# Patient Record
Sex: MALE | Race: WHITE | Employment: FULL TIME | URBAN - METROPOLITAN AREA
[De-identification: names, ages, dates, MRNs, and addresses within clinical notes are randomized per-mention and may not be internally consistent; named-entity substitution may affect disease eponyms.]

---

## 2017-01-02 LAB — INR: 2.7 (ref 2–3)

## 2017-01-03 ENCOUNTER — ANTI-COAG VISIT (OUTPATIENT)
Dept: INTERNAL MEDICINE CLINIC | Facility: CLINIC | Age: 62
End: 2017-01-03

## 2017-01-03 DIAGNOSIS — D68.9 CLOTTING DISORDER (HCC): Primary | ICD-10-CM

## 2017-02-02 ENCOUNTER — ANTI-COAG VISIT (OUTPATIENT)
Dept: INTERNAL MEDICINE CLINIC | Facility: CLINIC | Age: 62
End: 2017-02-02

## 2017-02-02 DIAGNOSIS — D68.9 CLOTTING DISORDER (HCC): Primary | ICD-10-CM

## 2017-02-02 LAB — INR: 2.5 (ref 2–3)

## 2017-03-01 LAB — INR: 2.2 (ref 2–3)

## 2017-03-03 ENCOUNTER — ANTI-COAG VISIT (OUTPATIENT)
Dept: INTERNAL MEDICINE CLINIC | Facility: CLINIC | Age: 62
End: 2017-03-03

## 2017-03-03 ENCOUNTER — TELEPHONE (OUTPATIENT)
Dept: INTERNAL MEDICINE CLINIC | Facility: CLINIC | Age: 62
End: 2017-03-03

## 2017-03-03 DIAGNOSIS — D68.9 BLOOD CLOTTING DISORDER (HCC): Primary | ICD-10-CM

## 2017-03-03 DIAGNOSIS — D68.9 CLOTTING DISORDER (HCC): Primary | ICD-10-CM

## 2017-03-04 PROBLEM — D68.9 BLOOD CLOTTING DISORDER (HCC): Status: ACTIVE | Noted: 2017-03-04

## 2017-03-26 LAB — INR: 2.5 (ref 2–3)

## 2017-03-28 ENCOUNTER — TELEPHONE (OUTPATIENT)
Dept: INTERNAL MEDICINE CLINIC | Facility: CLINIC | Age: 62
End: 2017-03-28

## 2017-03-28 DIAGNOSIS — D68.9 CLOTTING DISORDER (HCC): Primary | ICD-10-CM

## 2017-03-31 ENCOUNTER — ANTI-COAG VISIT (OUTPATIENT)
Dept: INTERNAL MEDICINE CLINIC | Facility: CLINIC | Age: 62
End: 2017-03-31

## 2017-03-31 DIAGNOSIS — D68.9 CLOTTING DISORDER (HCC): ICD-10-CM

## 2017-03-31 DIAGNOSIS — D68.9 BLOOD CLOTTING DISORDER (HCC): Primary | ICD-10-CM

## 2017-04-18 NOTE — TELEPHONE ENCOUNTER
· PLEASE ADVISE ON REFILL. THANKS.    Recent Visits       Provider Department Primary Dx    4 months ago Crystal Baker MD [facility], [facility], [address] Physical exam, annual    1 year ago Crystal Baker MD [facility], [facility], [address] [facility]

## 2017-04-20 RX ORDER — WARFARIN SODIUM 5 MG/1
TABLET ORAL
Qty: 135 TABLET | Refills: 0 | Status: SHIPPED | OUTPATIENT
Start: 2017-04-20 | End: 2017-07-16

## 2017-04-27 ENCOUNTER — ANTI-COAG VISIT (OUTPATIENT)
Dept: INTERNAL MEDICINE CLINIC | Facility: CLINIC | Age: 62
End: 2017-04-27

## 2017-04-27 DIAGNOSIS — D68.9 BLOOD CLOTTING DISORDER (HCC): Primary | ICD-10-CM

## 2017-04-27 DIAGNOSIS — D68.9 CLOTTING DISORDER (HCC): ICD-10-CM

## 2017-05-01 ENCOUNTER — TELEPHONE (OUTPATIENT)
Dept: INTERNAL MEDICINE CLINIC | Facility: CLINIC | Age: 62
End: 2017-05-01

## 2017-05-01 NOTE — TELEPHONE ENCOUNTER
[facility]  Correction code is - please see referral on 3/30/17-epic

## 2017-05-23 ENCOUNTER — ANTI-COAG VISIT (OUTPATIENT)
Dept: INTERNAL MEDICINE CLINIC | Facility: CLINIC | Age: 62
End: 2017-05-23

## 2017-05-23 ENCOUNTER — TELEPHONE (OUTPATIENT)
Dept: INTERNAL MEDICINE CLINIC | Facility: CLINIC | Age: 62
End: 2017-05-23

## 2017-05-23 DIAGNOSIS — D68.9 BLOOD CLOTTING DISORDER (HCC): Primary | ICD-10-CM

## 2017-05-23 DIAGNOSIS — D68.9 CLOTTING DISORDER (HCC): ICD-10-CM

## 2017-05-23 NOTE — TELEPHONE ENCOUNTER
Pt states that he had colonoscopy @ [facility] 2013 with a 5 year recheck.    Msg left on Med Record voicemail @ [facility].

## 2017-06-12 ENCOUNTER — TELEPHONE (OUTPATIENT)
Dept: INTERNAL MEDICINE CLINIC | Facility: CLINIC | Age: 62
End: 2017-06-12

## 2017-06-12 DIAGNOSIS — D68.69 SECONDARY HYPERCOAGULABLE STATE (HCC): Primary | ICD-10-CM

## 2017-06-12 NOTE — TELEPHONE ENCOUNTER
Shani Davenport from [facility] that the pt needs a referral for an INR. Kvng Fearing the code is . Call with any questions.

## 2017-06-15 NOTE — TELEPHONE ENCOUNTER
Dr. Sabino العراقي you need to start the process for this pt to get his supplies for his coagucheck [facility]. .  Please read the 1 st message.   Thanks nikhil

## 2017-06-15 NOTE — TELEPHONE ENCOUNTER
I,m not making any head way with Dr. Aureliano Mondragon. Is there something you folks can send to the  To have him fill out? We have not done any paper work for  This in the past and don't know where to start. Thank you.  nikhil

## 2017-06-15 NOTE — TELEPHONE ENCOUNTER
This information needs to be given to the PCP's ofc so they can enter the referral and have the dr sign off on it. Once it is entered we will automatically get the information .     Thank You

## 2017-06-15 NOTE — TELEPHONE ENCOUNTER
No, there is nothing for us to send over . We only process what the Dr's send to us .  We dont initiate anything,     Thank You

## 2017-06-16 NOTE — TELEPHONE ENCOUNTER
I have no idea how to do a REFERRAL for an INR. I ordered yearly INR order yesterday. I'm not sure how to enter that diagnosis  code on order. 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  Have someone send me a form or forward referral request like they

## 2017-06-16 NOTE — TELEPHONE ENCOUNTER
Referral signed. Please send to appropriate medical supplier listed in encounters so patient can continue to monitor PT/INR at home.

## 2017-06-26 ENCOUNTER — ANTI-COAG VISIT (OUTPATIENT)
Dept: INTERNAL MEDICINE CLINIC | Facility: CLINIC | Age: 62
End: 2017-06-26

## 2017-06-26 DIAGNOSIS — D68.9 BLOOD CLOTTING DISORDER (HCC): ICD-10-CM

## 2017-06-26 DIAGNOSIS — D68.9 CLOTTING DISORDER (HCC): ICD-10-CM

## 2017-07-18 RX ORDER — WARFARIN SODIUM 5 MG/1
TABLET ORAL
Qty: 135 TABLET | Refills: 0 | Status: SHIPPED | OUTPATIENT
Start: 2017-07-18 | End: 2017-10-21

## 2017-07-26 ENCOUNTER — TELEPHONE (OUTPATIENT)
Dept: INTERNAL MEDICINE CLINIC | Facility: CLINIC | Age: 62
End: 2017-07-26

## 2017-07-26 ENCOUNTER — ANTI-COAG VISIT (OUTPATIENT)
Dept: INTERNAL MEDICINE CLINIC | Facility: CLINIC | Age: 62
End: 2017-07-26

## 2017-07-26 DIAGNOSIS — D68.9 CLOTTING DISORDER (HCC): ICD-10-CM

## 2017-07-26 DIAGNOSIS — D68.9 BLOOD CLOTTING DISORDER (HCC): ICD-10-CM

## 2017-07-26 LAB — INR: 2.5 (ref 2–3)

## 2017-07-26 NOTE — TELEPHONE ENCOUNTER
[facility] medical record department states that they have no record of pt having colonoscopy.    Does pt remember name of gastroenterologist?   Left message to call back [phone]

## 2017-08-30 LAB — INR: 2.5 (ref 2–3)

## 2017-09-01 ENCOUNTER — ANTI-COAG VISIT (OUTPATIENT)
Dept: INTERNAL MEDICINE CLINIC | Facility: CLINIC | Age: 62
End: 2017-09-01

## 2017-09-01 DIAGNOSIS — D68.9 BLOOD CLOTTING DISORDER (HCC): ICD-10-CM

## 2017-09-01 DIAGNOSIS — D68.9 CLOTTING DISORDER (HCC): ICD-10-CM

## 2017-09-20 ENCOUNTER — TELEPHONE (OUTPATIENT)
Dept: INTERNAL MEDICINE CLINIC | Facility: CLINIC | Age: 62
End: 2017-09-20

## 2017-09-23 LAB — INR: 3 (ref 2–3)

## 2017-09-29 ENCOUNTER — ANTI-COAG VISIT (OUTPATIENT)
Dept: INTERNAL MEDICINE CLINIC | Facility: CLINIC | Age: 62
End: 2017-09-29

## 2017-09-29 DIAGNOSIS — D68.9 BLOOD CLOTTING DISORDER (HCC): ICD-10-CM

## 2017-09-29 DIAGNOSIS — D68.9 CLOTTING DISORDER (HCC): ICD-10-CM

## 2017-10-23 RX ORDER — WARFARIN SODIUM 5 MG/1
TABLET ORAL
Qty: 135 TABLET | Refills: 0 | Status: SHIPPED | OUTPATIENT
Start: 2017-10-23 | End: 2018-01-20

## 2017-10-27 ENCOUNTER — ANTI-COAG VISIT (OUTPATIENT)
Dept: INTERNAL MEDICINE CLINIC | Facility: CLINIC | Age: 62
End: 2017-10-27

## 2017-10-27 DIAGNOSIS — D68.9 BLOOD CLOTTING DISORDER (HCC): ICD-10-CM

## 2017-10-27 DIAGNOSIS — D68.9 CLOTTING DISORDER (HCC): ICD-10-CM

## 2017-11-07 ENCOUNTER — TELEPHONE (OUTPATIENT)
Dept: INTERNAL MEDICINE CLINIC | Facility: CLINIC | Age: 62
End: 2017-11-07

## 2017-11-07 NOTE — TELEPHONE ENCOUNTER
Patient is due for colorectal screening. Unable to locate previous report. Wife notified and states that she will track down the report and call back with the information.

## 2017-11-24 ENCOUNTER — ANTI-COAG VISIT (OUTPATIENT)
Dept: INTERNAL MEDICINE CLINIC | Facility: CLINIC | Age: 62
End: 2017-11-24

## 2017-11-24 DIAGNOSIS — D68.9 BLOOD CLOTTING DISORDER (HCC): ICD-10-CM

## 2017-11-24 DIAGNOSIS — D68.9 CLOTTING DISORDER (HCC): ICD-10-CM

## 2017-12-29 ENCOUNTER — ANTI-COAG VISIT (OUTPATIENT)
Dept: INTERNAL MEDICINE CLINIC | Facility: CLINIC | Age: 62
End: 2017-12-29

## 2017-12-29 DIAGNOSIS — D68.9 CLOTTING DISORDER (HCC): ICD-10-CM

## 2017-12-29 DIAGNOSIS — D68.9 BLOOD CLOTTING DISORDER (HCC): ICD-10-CM

## 2018-01-11 NOTE — TELEPHONE ENCOUNTER
Pending Prescriptions Disp Refills    SIMVASTATIN 20 MG Oral Tab [Pharmacy Med Name: SIMVASTATIN 20MG TABLETS] 90 tablet 0     Sig: TAKE 1 TABLET BY MOUTH EVERY NIGHT AT BEDTIME           Last Office Visit with PCP: Visit date not found  Last Blood Press

## 2018-01-12 RX ORDER — SIMVASTATIN 20 MG
TABLET ORAL
Qty: 90 TABLET | Refills: 0 | Status: SHIPPED | OUTPATIENT
Start: 2018-01-12 | End: 2018-04-13

## 2018-01-12 NOTE — TELEPHONE ENCOUNTER
Order Information     Ordered Status Priority Ordering User Department   01/12/18 Sent (none) Dre Person MD [facility]   Order History   Outpatient   Date/Time Action Taken User Additional Information   01/12/18 1216 Sign Yuniel Corral [place]

## 2018-01-15 ENCOUNTER — TELEPHONE (OUTPATIENT)
Dept: INTERNAL MEDICINE CLINIC | Facility: CLINIC | Age: 63
End: 2018-01-15

## 2018-01-15 NOTE — TELEPHONE ENCOUNTER
Pt called in stating he has a consult with his dentist on 1/25 because he is going to have a tooth extracted and a new one implanted. Pt is asking how long he may need to be off coumadin prior to the extraction? Please advise.

## 2018-01-15 NOTE — TELEPHONE ENCOUNTER
Hi Dr. Yuniel Corral,     Please advise on how many days pt should hold warfarin, coumadin clinic cannot give orders on that. Thanks.

## 2018-01-17 NOTE — TELEPHONE ENCOUNTER
Spoke with patient and relayed PVR message below--patient verbalizes understanding and agreement. Patient states 1/25/18 is only the consultation, not the actual surgery.  Patient states he may need something in writing re:Coumadin stop and start, but wi

## 2018-01-20 NOTE — TELEPHONE ENCOUNTER
LOV Cou[facility]in Clinic 12/29/17    LOV PVR 11/30/16 for Px    LR 10/23/17 fpr #135    Please advise on refill

## 2018-01-22 RX ORDER — WARFARIN SODIUM 5 MG/1
TABLET ORAL
Qty: 135 TABLET | Refills: 0 | Status: SHIPPED | OUTPATIENT
Start: 2018-01-22 | End: 2018-04-16

## 2018-02-02 ENCOUNTER — TELEPHONE (OUTPATIENT)
Dept: INTERNAL MEDICINE CLINIC | Facility: CLINIC | Age: 63
End: 2018-02-02

## 2018-02-02 NOTE — TELEPHONE ENCOUNTER
Dr Arce People called regarding pt  Pt is to stop coumadin 4 days prior to dental implant per Dr Melissa Navarro would like a note stating that   Faxed to his office  ROM#[phone]

## 2018-02-03 ENCOUNTER — TELEPHONE (OUTPATIENT)
Dept: INTERNAL MEDICINE CLINIC | Facility: CLINIC | Age: 63
End: 2018-02-03

## 2018-02-03 NOTE — TELEPHONE ENCOUNTER
I was paged unable to get in tough with [facility]    I called pt instead   INR 5.5 per his monitor  He held coumadin today and will hold tomorrow as well  Per pt off coumadin next week for procedure  Will check coumadin on Monday

## 2018-02-05 ENCOUNTER — ANTI-COAG VISIT (OUTPATIENT)
Dept: INTERNAL MEDICINE CLINIC | Facility: CLINIC | Age: 63
End: 2018-02-05

## 2018-02-05 DIAGNOSIS — D68.9 CLOTTING DISORDER (HCC): ICD-10-CM

## 2018-02-05 DIAGNOSIS — D68.9 BLOOD CLOTTING DISORDER (HCC): ICD-10-CM

## 2018-02-05 LAB — INR: 5.5 (ref 2–3)

## 2018-02-05 NOTE — TELEPHONE ENCOUNTER
Pt calling requesting note to be sent to  Dr Lake Torres office stating pt is ok to be off coumadin 4 days prior to the procedure. Pt states his appt for the procedure is 02/09. Please rush and fax to [phone].

## 2018-02-14 ENCOUNTER — TELEPHONE (OUTPATIENT)
Dept: INTERNAL MEDICINE CLINIC | Facility: CLINIC | Age: 63
End: 2018-02-14

## 2018-02-14 DIAGNOSIS — D68.69 SECONDARY HYPERCOAGULABLE STATE (HCC): Primary | ICD-10-CM

## 2018-02-14 NOTE — TELEPHONE ENCOUNTER
[facility] supplies rep called and requested supplies for patient's coumadin. Pended referral.  Please review diagnois and sign off if you approve. Thank you!  Becky Jimenez [phone] [facility]

## 2018-02-19 ENCOUNTER — OFFICE VISIT (OUTPATIENT)
Dept: INTERNAL MEDICINE CLINIC | Facility: CLINIC | Age: 63
End: 2018-02-19

## 2018-02-19 VITALS
BODY MASS INDEX: 28.63 KG/M2 | HEART RATE: 65 BPM | WEIGHT: 200 LBS | SYSTOLIC BLOOD PRESSURE: 151 MMHG | DIASTOLIC BLOOD PRESSURE: 78 MMHG | HEIGHT: 70 IN | RESPIRATION RATE: 16 BRPM

## 2018-02-19 DIAGNOSIS — D68.59 HYPERCOAGULABLE STATE (HCC): ICD-10-CM

## 2018-02-19 DIAGNOSIS — Z00.00 PHYSICAL EXAM, ANNUAL: Primary | ICD-10-CM

## 2018-02-19 DIAGNOSIS — Z12.11 SCREENING FOR COLORECTAL CANCER: ICD-10-CM

## 2018-02-19 DIAGNOSIS — Z12.12 SCREENING FOR COLORECTAL CANCER: ICD-10-CM

## 2018-02-19 PROCEDURE — 99396 PREV VISIT EST AGE 40-64: CPT | Performed by: INTERNAL MEDICINE

## 2018-02-19 NOTE — PROGRESS NOTES
Darien Garcia is a 57year old male who presents for a complete physical exam.   HPI:   Pt complains of nothing. There is no immunization history on file for this patient.   Wt Readings from Last 6 Encounters:  02/19/18 : 200 lb (90.7 kg)  11/30/16 : Disorder Father       Social History:  Smoking status: Current Some Day Smoker                                                    Packs/day: 0.00      Years: 20.00        Types: Cigars  Alcohol use: Yes           6.0 oz/week     Cans of beer: 12 per week intact    ASSESSMENT AND PLAN:   1. Physical exam, annual    Annika Mathews is a 57year old male who presents for a complete physical exam.  Pt's weight is Body mass index is 28.7 kg/m². , recommended low fat diet and aerobic exercise 30 minutes three time

## 2018-02-28 LAB — INR: 2 (ref 2–3)

## 2018-03-01 ENCOUNTER — ANTI-COAG VISIT (OUTPATIENT)
Dept: INTERNAL MEDICINE CLINIC | Facility: CLINIC | Age: 63
End: 2018-03-01

## 2018-03-01 DIAGNOSIS — D68.9 BLOOD CLOTTING DISORDER (HCC): ICD-10-CM

## 2018-03-01 DIAGNOSIS — D68.9 CLOTTING DISORDER (HCC): ICD-10-CM

## 2018-03-24 LAB — INR: 3.2 (ref 2–3)

## 2018-03-28 ENCOUNTER — TELEPHONE (OUTPATIENT)
Dept: INTERNAL MEDICINE CLINIC | Facility: CLINIC | Age: 63
End: 2018-03-28

## 2018-03-28 ENCOUNTER — ANTI-COAG VISIT (OUTPATIENT)
Dept: INTERNAL MEDICINE CLINIC | Facility: CLINIC | Age: 63
End: 2018-03-28

## 2018-03-28 DIAGNOSIS — D68.59 HYPERCOAGULOPATHY (HCC): ICD-10-CM

## 2018-03-28 DIAGNOSIS — D68.59 HYPERCOAGULOPATHY (HCC): Primary | ICD-10-CM

## 2018-03-28 DIAGNOSIS — D68.9 BLOOD CLOTTING DISORDER (HCC): ICD-10-CM

## 2018-03-28 DIAGNOSIS — D68.9 CLOTTING DISORDER (HCC): ICD-10-CM

## 2018-03-28 NOTE — TELEPHONE ENCOUNTER
Dr. Kelly Escamilla,          Patient's current anticoagulation monitoring order with Coumadin Clinic is  3/4/18. Please sign pending order for renewal.     Thank you.

## 2018-03-29 ENCOUNTER — ANTI-COAG VISIT (OUTPATIENT)
Dept: INTERNAL MEDICINE CLINIC | Facility: CLINIC | Age: 63
End: 2018-03-29

## 2018-03-29 DIAGNOSIS — D68.9 CLOTTING DISORDER (HCC): ICD-10-CM

## 2018-03-29 DIAGNOSIS — D68.59 HYPERCOAGULOPATHY (HCC): ICD-10-CM

## 2018-03-29 DIAGNOSIS — D68.9 BLOOD CLOTTING DISORDER (HCC): ICD-10-CM

## 2018-04-13 ENCOUNTER — TELEPHONE (OUTPATIENT)
Dept: INTERNAL MEDICINE CLINIC | Facility: CLINIC | Age: 63
End: 2018-04-13

## 2018-04-13 RX ORDER — SIMVASTATIN 20 MG
TABLET ORAL
Qty: 90 TABLET | Refills: 0 | Status: SHIPPED | OUTPATIENT
Start: 2018-04-13 | End: 2018-07-11

## 2018-04-13 NOTE — TELEPHONE ENCOUNTER
Pt stts he had labs done @ [email or web address] [place] location  Pt stts [facility] advised him they have no relationship with PCP  Pt was billed for labs  Pt also is still awaiting lab results   Please advise   Alt phone # [phone]

## 2018-04-15 ENCOUNTER — TELEPHONE (OUTPATIENT)
Dept: INTERNAL MEDICINE CLINIC | Facility: CLINIC | Age: 63
End: 2018-04-15

## 2018-04-15 NOTE — TELEPHONE ENCOUNTER
Labs show Your blood glucose is elevated above what is considered a normal fasting level which should be under 100 mg/dl. This suggests that you are prone to developing diabetes if this value continues to go up over time which often happens.  Regular exerci

## 2018-04-16 ENCOUNTER — TELEPHONE (OUTPATIENT)
Dept: OTHER | Age: 63
End: 2018-04-16

## 2018-04-16 RX ORDER — WARFARIN SODIUM 5 MG/1
TABLET ORAL
Qty: 135 TABLET | Refills: 0 | Status: SHIPPED | OUTPATIENT
Start: 2018-04-16 | End: 2018-07-15

## 2018-04-16 NOTE — TELEPHONE ENCOUNTER
Patient calling stating that he got charged $333 for [facility] labs ordered by Dr. Angela Abdullahi on 11/2017. Per patient, he has contacted insurance company.      Patient states, per insurance company, there is not direct relationship between [facility] and [address] [place] [address]

## 2018-05-02 ENCOUNTER — ANTI-COAG VISIT (OUTPATIENT)
Dept: INTERNAL MEDICINE CLINIC | Facility: CLINIC | Age: 63
End: 2018-05-02

## 2018-05-02 DIAGNOSIS — D68.9 BLOOD CLOTTING DISORDER (HCC): ICD-10-CM

## 2018-05-02 DIAGNOSIS — D68.59 HYPERCOAGULOPATHY (HCC): ICD-10-CM

## 2018-05-02 DIAGNOSIS — D68.9 CLOTTING DISORDER (HCC): ICD-10-CM

## 2018-05-22 ENCOUNTER — MED REC SCAN ONLY (OUTPATIENT)
Dept: CARDIOLOGY CLINIC | Facility: CLINIC | Age: 63
End: 2018-05-22

## 2018-05-31 ENCOUNTER — TELEPHONE (OUTPATIENT)
Dept: INTERNAL MEDICINE CLINIC | Facility: CLINIC | Age: 63
End: 2018-05-31

## 2018-05-31 NOTE — TELEPHONE ENCOUNTER
Taj Rawls from [facility] calling to assist pt regarding issue with 2/19/18 lab orders for [facility]       Pt advised sent to [facility]- out of network so claim denied & pt charged  $600    Asking for office to contact pt to assist in resolving- questioning  if  referral can be done to [facility]- Referral Dept advised to forward  to clinical staff    Also See 4/16/18 encounter to karis Westfall/[facility] ph# [phone]- no callback requested- can contact pt directly

## 2018-06-01 ENCOUNTER — ANTI-COAG VISIT (OUTPATIENT)
Dept: INTERNAL MEDICINE CLINIC | Facility: CLINIC | Age: 63
End: 2018-06-01

## 2018-06-01 DIAGNOSIS — D68.9 BLOOD CLOTTING DISORDER (HCC): ICD-10-CM

## 2018-06-01 DIAGNOSIS — D68.9 CLOTTING DISORDER (HCC): ICD-10-CM

## 2018-06-01 DIAGNOSIS — D68.59 HYPERCOAGULOPATHY (HCC): ICD-10-CM

## 2018-06-04 ENCOUNTER — TELEPHONE (OUTPATIENT)
Dept: INTERNAL MEDICINE CLINIC | Facility: CLINIC | Age: 63
End: 2018-06-04

## 2018-06-04 DIAGNOSIS — D68.9 CLOTTING DISORDER (HCC): Primary | ICD-10-CM

## 2018-06-14 ENCOUNTER — TELEPHONE (OUTPATIENT)
Dept: INTERNAL MEDICINE CLINIC | Facility: CLINIC | Age: 63
End: 2018-06-14

## 2018-06-14 NOTE — TELEPHONE ENCOUNTER
Harjinder Hernandez from [facility] would like status on compression stockings from an order they have requested 10days ago, please advise

## 2018-06-26 ENCOUNTER — ANTI-COAG VISIT (OUTPATIENT)
Dept: INTERNAL MEDICINE CLINIC | Facility: CLINIC | Age: 63
End: 2018-06-26

## 2018-06-26 DIAGNOSIS — D68.9 CLOTTING DISORDER (HCC): ICD-10-CM

## 2018-06-26 DIAGNOSIS — D68.59 HYPERCOAGULOPATHY (HCC): ICD-10-CM

## 2018-06-26 DIAGNOSIS — D68.9 BLOOD CLOTTING DISORDER (HCC): ICD-10-CM

## 2018-07-11 RX ORDER — SIMVASTATIN 20 MG
TABLET ORAL
Qty: 90 TABLET | Refills: 0 | Status: SHIPPED | OUTPATIENT
Start: 2018-07-11 | End: 2018-10-24

## 2018-07-11 RX ORDER — SIMVASTATIN 20 MG
TABLET ORAL
Qty: 90 TABLET | Refills: 0 | OUTPATIENT
Start: 2018-07-11

## 2018-07-16 RX ORDER — WARFARIN SODIUM 5 MG/1
TABLET ORAL
Qty: 135 TABLET | Refills: 0 | Status: SHIPPED | OUTPATIENT
Start: 2018-07-16 | End: 2018-10-17

## 2018-07-17 RX ORDER — SIMVASTATIN 20 MG
TABLET ORAL
Qty: 90 TABLET | Refills: 0 | OUTPATIENT
Start: 2018-07-17

## 2018-07-18 NOTE — TELEPHONE ENCOUNTER
[id number], [address], [phone]   Medication Detail      Disp Refills Start End    simvastatin 20 MG Oral Tab 90 tablet 0 7/11/2018     Sig: TAKE 1 TABLET BY MOUTH EVERY NIGHT AT BEDTIME    E-Prescribing Statu

## 2018-07-23 RX ORDER — SIMVASTATIN 20 MG
TABLET ORAL
Qty: 90 TABLET | Refills: 0 | Status: SHIPPED | OUTPATIENT
Start: 2018-07-23 | End: 2019-01-23

## 2018-07-26 ENCOUNTER — TELEPHONE (OUTPATIENT)
Dept: INTERNAL MEDICINE CLINIC | Facility: CLINIC | Age: 63
End: 2018-07-26

## 2018-07-26 DIAGNOSIS — I82.90 ANTEMORTEM THROMBUS: Primary | ICD-10-CM

## 2018-07-26 NOTE — TELEPHONE ENCOUNTER
Received a faxed from [employer] requesting a referral for compression hose.      Please sign off on referral request.     Thank you, Asa

## 2018-07-30 ENCOUNTER — ANTI-COAG VISIT (OUTPATIENT)
Dept: CARDIOLOGY CLINIC | Facility: CLINIC | Age: 63
End: 2018-07-30

## 2018-07-30 DIAGNOSIS — D68.9 CLOTTING DISORDER (HCC): ICD-10-CM

## 2018-07-30 DIAGNOSIS — D68.59 HYPERCOAGULOPATHY (HCC): ICD-10-CM

## 2018-07-30 DIAGNOSIS — D68.9 BLOOD CLOTTING DISORDER (HCC): ICD-10-CM

## 2018-07-30 LAB — INR: 2 (ref 2–3)

## 2018-08-29 LAB — INR: 3.1 (ref 2–3)

## 2018-08-30 ENCOUNTER — ANTI-COAG VISIT (OUTPATIENT)
Dept: INTERNAL MEDICINE CLINIC | Facility: CLINIC | Age: 63
End: 2018-08-30

## 2018-08-30 DIAGNOSIS — D68.9 CLOTTING DISORDER (HCC): ICD-10-CM

## 2018-08-30 DIAGNOSIS — D68.9 BLOOD CLOTTING DISORDER (HCC): ICD-10-CM

## 2018-08-30 DIAGNOSIS — D68.59 HYPERCOAGULOPATHY (HCC): ICD-10-CM

## 2018-09-04 ENCOUNTER — MED REC SCAN ONLY (OUTPATIENT)
Dept: CARDIOLOGY CLINIC | Facility: CLINIC | Age: 63
End: 2018-09-04

## 2018-10-03 ENCOUNTER — ANTI-COAG VISIT (OUTPATIENT)
Dept: CARDIOLOGY CLINIC | Facility: CLINIC | Age: 63
End: 2018-10-03

## 2018-10-03 ENCOUNTER — MED REC SCAN ONLY (OUTPATIENT)
Dept: CARDIOLOGY CLINIC | Facility: CLINIC | Age: 63
End: 2018-10-03

## 2018-10-03 DIAGNOSIS — D68.9 BLOOD CLOTTING DISORDER (HCC): ICD-10-CM

## 2018-10-03 DIAGNOSIS — D68.59 HYPERCOAGULOPATHY (HCC): ICD-10-CM

## 2018-10-03 DIAGNOSIS — D68.9 CLOTTING DISORDER (HCC): ICD-10-CM

## 2018-10-18 RX ORDER — WARFARIN SODIUM 5 MG/1
TABLET ORAL
Qty: 135 TABLET | Refills: 0 | Status: SHIPPED | OUTPATIENT
Start: 2018-10-18 | End: 2019-01-07

## 2018-10-18 NOTE — TELEPHONE ENCOUNTER
Requested Prescriptions     Pending Prescriptions Disp Refills   • COUMADIN 5 MG Oral Tab [Pharmacy Med Name: COUMADIN 5MG TABLETS (PEACH)] 135 tablet 0     Sig: TAKE 1 1/2 TABLETS BY MOUTH EVERY EVENING OR AS DIRECTED BY COUMADIN CLINIC       Last Office

## 2018-10-23 RX ORDER — SIMVASTATIN 20 MG
TABLET ORAL
Qty: 90 TABLET | Refills: 0 | OUTPATIENT
Start: 2018-10-23

## 2018-10-24 RX ORDER — SIMVASTATIN 20 MG
TABLET ORAL
Qty: 90 TABLET | Refills: 0 | Status: SHIPPED | OUTPATIENT
Start: 2018-10-24 | End: 2019-01-23

## 2018-10-31 ENCOUNTER — ANTI-COAG VISIT (OUTPATIENT)
Dept: INTERNAL MEDICINE CLINIC | Facility: CLINIC | Age: 63
End: 2018-10-31

## 2018-10-31 DIAGNOSIS — D68.9 CLOTTING DISORDER (HCC): ICD-10-CM

## 2018-10-31 DIAGNOSIS — D68.59 HYPERCOAGULOPATHY (HCC): ICD-10-CM

## 2018-10-31 DIAGNOSIS — D68.9 BLOOD CLOTTING DISORDER (HCC): ICD-10-CM

## 2018-10-31 PROCEDURE — 93793 ANTICOAG MGMT PT WARFARIN: CPT | Performed by: INTERNAL MEDICINE

## 2018-12-18 ENCOUNTER — ANTI-COAG VISIT (OUTPATIENT)
Dept: INTERNAL MEDICINE CLINIC | Facility: CLINIC | Age: 63
End: 2018-12-18

## 2018-12-18 DIAGNOSIS — D68.59 HYPERCOAGULOPATHY (HCC): ICD-10-CM

## 2018-12-18 DIAGNOSIS — D68.9 CLOTTING DISORDER (HCC): ICD-10-CM

## 2018-12-18 DIAGNOSIS — D68.9 BLOOD CLOTTING DISORDER (HCC): ICD-10-CM

## 2018-12-18 PROCEDURE — 93793 ANTICOAG MGMT PT WARFARIN: CPT | Performed by: INTERNAL MEDICINE

## 2018-12-26 ENCOUNTER — MED REC SCAN ONLY (OUTPATIENT)
Dept: INTERNAL MEDICINE CLINIC | Facility: CLINIC | Age: 63
End: 2018-12-26

## 2019-01-07 RX ORDER — WARFARIN SODIUM 5 MG/1
TABLET ORAL
Qty: 135 TABLET | Refills: 0 | Status: SHIPPED | OUTPATIENT
Start: 2019-01-07 | End: 2019-03-17

## 2019-01-23 ENCOUNTER — TELEPHONE (OUTPATIENT)
Dept: GASTROENTEROLOGY | Facility: CLINIC | Age: 64
End: 2019-01-23

## 2019-01-23 ENCOUNTER — OFFICE VISIT (OUTPATIENT)
Dept: GASTROENTEROLOGY | Facility: CLINIC | Age: 64
End: 2019-01-23

## 2019-01-23 VITALS — SYSTOLIC BLOOD PRESSURE: 125 MMHG | DIASTOLIC BLOOD PRESSURE: 68 MMHG | HEART RATE: 61 BPM

## 2019-01-23 DIAGNOSIS — Z86.010 HX OF COLONIC POLYPS: Primary | ICD-10-CM

## 2019-01-23 DIAGNOSIS — Z92.29 HISTORY OF COUMADIN THERAPY: ICD-10-CM

## 2019-01-23 DIAGNOSIS — Z86.010 HISTORY OF COLON POLYPS: Primary | ICD-10-CM

## 2019-01-23 PROCEDURE — 99243 OFF/OP CNSLTJ NEW/EST LOW 30: CPT | Performed by: INTERNAL MEDICINE

## 2019-01-23 PROCEDURE — 99212 OFFICE O/P EST SF 10 MIN: CPT | Performed by: INTERNAL MEDICINE

## 2019-01-23 NOTE — PROGRESS NOTES
Omar Muhammad is a 70year old male.     HPI:   Patient presents with:  Colonoscopy Screening: last colonoscopy 5 yrs ago @ [address] [facility] per pt    The patient is a 68-year-old male who has a history of Antithrombin III deficiency and prior history of DVT total) by mouth one time for 1 dose.  Take as directed Disp: 1 Bottle Rfl: 0   COUMADIN 5 MG Oral Tab TAKE 1 1/2 TABLETS BY MOUTH EVERY EVENING OR AS DIRECTED BY COUMADIN CLINIC Disp: 135 tablet Rfl: 0   SIMVASTATIN 20 MG Oral Tab TAKE 1 TABLET BY MOUTH HUMZA

## 2019-01-23 NOTE — PATIENT INSTRUCTIONS
Colonoscopy for history of colon polyps  - colyte prep   - hold coumadin 3 days before and check stat PT/INR am of procedure  - MAC sedation

## 2019-01-23 NOTE — TELEPHONE ENCOUNTER
Scheduled for:  Colonoscopy 71413  Provider Name: Dr Jw Noriega  Date:  Fri 4/19/19  Location:  [facility]  Sedation:  MAC  Time:  9:15 am  Prep: split dose colyte  Meds/Allergies Reconciled?:  NKDA  Diagnosis with codes:  HCP Z86.010  Was patient informed to call ins

## 2019-01-25 RX ORDER — SIMVASTATIN 20 MG
TABLET ORAL
Qty: 90 TABLET | Refills: 0 | Status: SHIPPED | OUTPATIENT
Start: 2019-01-25 | End: 2019-04-25

## 2019-01-30 LAB — INR: 2.2 (ref 2–3)

## 2019-01-31 ENCOUNTER — ANTI-COAG VISIT (OUTPATIENT)
Dept: INTERNAL MEDICINE CLINIC | Facility: CLINIC | Age: 64
End: 2019-01-31

## 2019-01-31 DIAGNOSIS — D68.9 CLOTTING DISORDER (HCC): ICD-10-CM

## 2019-01-31 DIAGNOSIS — D68.59 HYPERCOAGULOPATHY (HCC): ICD-10-CM

## 2019-01-31 DIAGNOSIS — D68.9 BLOOD CLOTTING DISORDER (HCC): ICD-10-CM

## 2019-01-31 PROCEDURE — 93793 ANTICOAG MGMT PT WARFARIN: CPT | Performed by: INTERNAL MEDICINE

## 2019-02-06 ENCOUNTER — MED REC SCAN ONLY (OUTPATIENT)
Dept: INTERNAL MEDICINE CLINIC | Facility: CLINIC | Age: 64
End: 2019-02-06

## 2019-02-25 LAB — INR: 2.3 (ref 2–3)

## 2019-02-26 ENCOUNTER — ANTI-COAG VISIT (OUTPATIENT)
Dept: INTERNAL MEDICINE CLINIC | Facility: CLINIC | Age: 64
End: 2019-02-26

## 2019-02-26 DIAGNOSIS — D68.59 HYPERCOAGULOPATHY (HCC): ICD-10-CM

## 2019-02-26 DIAGNOSIS — D68.9 CLOTTING DISORDER (HCC): ICD-10-CM

## 2019-02-26 DIAGNOSIS — D68.9 BLOOD CLOTTING DISORDER (HCC): ICD-10-CM

## 2019-03-19 RX ORDER — WARFARIN SODIUM 5 MG/1
TABLET ORAL
Qty: 135 TABLET | Refills: 0 | Status: SHIPPED | OUTPATIENT
Start: 2019-03-19 | End: 2019-06-09

## 2019-03-19 NOTE — TELEPHONE ENCOUNTER
Future Appointments   Date Time Provider Paloma Trang   4/19/2019  9:15 AM [facility], PROCEDURE [id number] None   4/25/2019 10:30 AM Genet Rodriguez MD [facility]       No Protocol on this med.      Requested Prescriptions     Pending Prescrip

## 2019-03-27 LAB — INR: 2.3 (ref 2–3)

## 2019-03-28 ENCOUNTER — ANTI-COAG VISIT (OUTPATIENT)
Dept: INTERNAL MEDICINE CLINIC | Facility: CLINIC | Age: 64
End: 2019-03-28

## 2019-03-28 DIAGNOSIS — D68.9 CLOTTING DISORDER (HCC): ICD-10-CM

## 2019-03-28 DIAGNOSIS — D68.9 BLOOD CLOTTING DISORDER (HCC): ICD-10-CM

## 2019-03-28 DIAGNOSIS — D68.59 HYPERCOAGULOPATHY (HCC): ICD-10-CM

## 2019-03-28 PROCEDURE — 93793 ANTICOAG MGMT PT WARFARIN: CPT | Performed by: INTERNAL MEDICINE

## 2019-04-08 NOTE — TELEPHONE ENCOUNTER
Physical Therapy Daily Treatment Note     Name: Matthew Graham Blakemore  [facility] Number: [id number]    Therapy Diagnosis: No diagnosis found.  Physician: Mendel Lucio MD    Visit Date: 4/8/2019    Physician Orders: Eval and Treat  Medical Diagnosis: Tendinitis of Left Shoulder   Evaluation Date: 4/4/2019  Authorization Period Expiration: 7/24/19  Plan of Care Certification Period: 6/30/19  Visit #/Visits authorized: 3/16    Precautions: Standard    Subjective     Pt reports:   Daily Subjective: He was very sore in AC and GH joints following last session. States the muscles in his shoulders feel looser and he has improved ROM with decreased pain on L.   He was compliant with home exercise program.  Response to previous treatment: pt reports that he was extremely sore following initial evaluation  Functional change: no changes noted    Pain: Pre-treatment: 4/10  Post-treatment: 6/10  Location: Posterior left shoulder     Objective   Objective information below is from initial evaluation unless bolded today.    ROM    Right (degrees) Left (degrees)   Shoulder Flexion  168 168, pain with AROM    Shoulder Abduction    Pain , 139 , opain with AROM    Shoulder Extension WFL WFL    Shoulder Internal Rotation 35 Pain, WFL 62   Shoulder External Rotation 75 64      Slight scapular winging with repeated shoulder flexion      Joint Mobility    Right Left    Thoracic PA Gap Hypomobile Hypomobile   Thoracic PA [place]  Hypomobile Hypomobile   Rib Inhalation Normal Normal   Rib Exhalation  Hypomobile Hypomobile   Glenohumeral Distraction Normal Normal   Glenohumeral Inferior [place] Normal Hypomobile   Glenohumeral Anterior Glide Normal Hypomobile, pain    Glenohumeral Posterior Glide  Normal Normal      Strength    Right     Left   Shoulder Flexion 4+/5 4-/5   Shoulder Abduction 4+/5 4-/5   Shoulder Extension  5/5 4+/5   Shoulder External Rotation  4/5 4-/5   Shoulder Internal Rotation  4+/5 4/5   Elbow Flexion 5/5 5/5   Elbow  Patient notified of lab results. Patient verbalizes understanding of Dr. Patty Lucia's instructions. Extension  5/5 5/5      Special Tests:  Test Right Left   Empty Can  negative positive   [place] Justen negative positive      Palpation: Pt presents with increased tone at Biceps, supraspinatus, anterior and lateral deltoid, teres minor and teres major.       Movement Analysis: Pt presents with increased scapular winging with repeated shoulder flexion      Other: Pt presents with postural abnormalities which include: slouched posture, forward head, rounded shoulders  and increased thoracic kyphosis       Treatment:     Richard received therapeutic exercises to develop strength, ROM and posture for 18 minutes including:  Scapular Retractions: 20#, 3 x 10   SL Shoulder ER: 2#, 3 x 10, L only   Plank push up plus: 2 x 10    Richard received the following manual therapy techniques: Soft tissue Mobilization were applied to the: shoulder for 35 minutes, including:  STM of supraspinatus, biceps, teres minor, teres major, and deltoid   Grade III GH joint distraction, inferior glide and anterior glide. Grade III AC joint mobility    Home Exercises Provided and Patient Education Provided     Education provided:   Patient educated on biomechanical justification for therapeutic exercise and importance of compliance with HEP in order to improve overall imparirments and QOL     Written Home Exercises Provided: yes.  Exercises were reviewed and Richard was able to demonstrate them prior to the end of the session.  Richard demonstrated good  understanding of the education provided.     See EMR under Patient Instructions for exercises provided 4/4/2019.    Assessment     Patient tolerated manual therapy well with reports of decreased tension and increased mobility following. Patient tolerated therapeutic exercise well with reports of returning tightness at posterior shoulder following shoulder ER. Pt reports grinding sensation and pain in AC joint following plank push up plus. Patient required continued cueing to avoid upper  trapezius activation with scapular retractions.     Richard is progressing well towards his goals.   Pt prognosis is Good.     Pt will continue to benefit from skilled outpatient physical therapy to address the deficits listed in the problem list box on initial evaluation, provide pt/family education and to maximize pt's level of independence in the home and community environment.     Pt's spiritual, cultural and educational needs considered and pt agreeable to plan of care and goals.      Goals:   Short Term Goals: 4 weeks   1. Pt will demonstrate improved pain by reports of 6/10 worst pain on the verbal rating scale, decreased dependence on pain medication, and return to functional and recreational activities at 60% of full function.   2. Pt will demonstrate improved function as indicated by a score of 84% on the UEFS.  3. Patient will demonstrate improved AROM by 50%, when compared to that of unaffected extremity, in order to improve functional independence.  4. Pt will demonstrate improved strength by 50% when compared to that of unaffected extremity, in order to improve functional independence.   5. Pt will demonstrate improved postural endurance with reports of holding proper posture in 15 minute increments without pain.      Long Term Goals: 8 weeks   1. Pt will demonstrate improved pain by reports of 3/10 worst pain on the verbal rating scale, decreased dependence on pain medication, and return to functional and recreational activities at greater than or equal to 80% of full function.   2. Pt will demonstrate improved function as indicated by a score of greater than or equal to 92% on the UEFS.  3. Patient will demonstrate improved AROM by 90%, when compared to that of unaffected extremity, in order to improve functional independence.  4. Pt will demonstrate improved strength by 90% when compared to that of unaffected extremity, in order to improve functional independence.   5. Pt will demonstrate improved  postural endurance with reports of holding proper posture in 30 minute increments without pain.         Plan   Continue Plan of Care (POC) and progress per patient tolerance.        Gemma Osullivan, PT

## 2019-04-19 ENCOUNTER — ANESTHESIA (OUTPATIENT)
Dept: ENDOSCOPY | Facility: HOSPITAL | Age: 64
End: 2019-04-19
Payer: COMMERCIAL

## 2019-04-19 ENCOUNTER — HOSPITAL ENCOUNTER (OUTPATIENT)
Facility: HOSPITAL | Age: 64
Setting detail: HOSPITAL OUTPATIENT SURGERY
Discharge: HOME | End: 2019-04-19
Attending: INTERNAL MEDICINE | Admitting: INTERNAL MEDICINE
Payer: COMMERCIAL

## 2019-04-19 ENCOUNTER — ANESTHESIA EVENT (OUTPATIENT)
Dept: ENDOSCOPY | Facility: HOSPITAL | Age: 64
End: 2019-04-19
Payer: COMMERCIAL

## 2019-04-19 VITALS
BODY MASS INDEX: 28.7 KG/M2 | WEIGHT: 205 LBS | HEART RATE: 64 BPM | RESPIRATION RATE: 13 BRPM | OXYGEN SATURATION: 98 % | SYSTOLIC BLOOD PRESSURE: 111 MMHG | HEIGHT: 71 IN | DIASTOLIC BLOOD PRESSURE: 77 MMHG

## 2019-04-19 DIAGNOSIS — Z86.010 HX OF COLONIC POLYPS: ICD-10-CM

## 2019-04-19 PROCEDURE — 0DBL8ZX EXCISION OF TRANSVERSE COLON, VIA NATURAL OR ARTIFICIAL OPENING ENDOSCOPIC, DIAGNOSTIC: ICD-10-PCS | Performed by: INTERNAL MEDICINE

## 2019-04-19 PROCEDURE — 0DBK8ZX EXCISION OF ASCENDING COLON, VIA NATURAL OR ARTIFICIAL OPENING ENDOSCOPIC, DIAGNOSTIC: ICD-10-PCS | Performed by: INTERNAL MEDICINE

## 2019-04-19 PROCEDURE — 45385 COLONOSCOPY W/LESION REMOVAL: CPT | Performed by: INTERNAL MEDICINE

## 2019-04-19 RX ORDER — NALOXONE HYDROCHLORIDE 0.4 MG/ML
80 INJECTION, SOLUTION INTRAMUSCULAR; INTRAVENOUS; SUBCUTANEOUS AS NEEDED
Status: DISCONTINUED | OUTPATIENT
Start: 2019-04-19 | End: 2019-04-19

## 2019-04-19 RX ORDER — LIDOCAINE HYDROCHLORIDE 10 MG/ML
INJECTION, SOLUTION EPIDURAL; INFILTRATION; INTRACAUDAL; PERINEURAL AS NEEDED
Status: DISCONTINUED | OUTPATIENT
Start: 2019-04-19 | End: 2019-04-19 | Stop reason: SURG

## 2019-04-19 RX ORDER — SODIUM CHLORIDE, SODIUM LACTATE, POTASSIUM CHLORIDE, CALCIUM CHLORIDE 600; 310; 30; 20 MG/100ML; MG/100ML; MG/100ML; MG/100ML
INJECTION, SOLUTION INTRAVENOUS CONTINUOUS
Status: DISCONTINUED | OUTPATIENT
Start: 2019-04-19 | End: 2019-04-19

## 2019-04-19 RX ADMIN — SODIUM CHLORIDE, SODIUM LACTATE, POTASSIUM CHLORIDE, CALCIUM CHLORIDE: 600; 310; 30; 20 INJECTION, SOLUTION INTRAVENOUS at 09:13:00

## 2019-04-19 RX ADMIN — LIDOCAINE HYDROCHLORIDE 50 MG: 10 INJECTION, SOLUTION EPIDURAL; INFILTRATION; INTRACAUDAL; PERINEURAL at 09:16:00

## 2019-04-19 RX ADMIN — SODIUM CHLORIDE, SODIUM LACTATE, POTASSIUM CHLORIDE, CALCIUM CHLORIDE: 600; 310; 30; 20 INJECTION, SOLUTION INTRAVENOUS at 09:46:00

## 2019-04-19 NOTE — ANESTHESIA POSTPROCEDURE EVALUATION
Patient: Frank Farrar    Procedure Summary     Date:  04/19/19 Room / Location:  [facility] ENDOSCOPY 01 / [facility] ENDOSCOPY    Anesthesia Start:  7084 Anesthesia Stop:  6774    Procedure:  COLONOSCOPY (N/A ) Diagnosis:       Hx of colonic polyps      (Diverticulos

## 2019-04-19 NOTE — OPERATIVE REPORT
[facility] HOSP - [facility] Endoscopy Report      Preoperative Diagnosis:  - history of colon polyps      Postoperative Diagnosis:  - colon polyps x 3  - diverticulosis  - internal hemorrhoids      Procedure:    Colonoscopy       Surgeon:  CHEPE Franks

## 2019-04-19 NOTE — ANESTHESIA PREPROCEDURE EVALUATION
Anesthesia PreOp Note    HPI:     Kayren Bernheim is a 70year old male who presents for preoperative consultation requested by: Dyana Coombs MD    Date of Surgery: 4/19/2019    Procedure(s):  COLONOSCOPY  Indication: Hx of colonic polyps [Z86.010] Problem Relation Age of Onset   • Heart Disorder Mother         a fibrillation   • Other (septicemia) Mother         septicemia   • Hypertension Father    • Heart Disorder Father    • Diabetes Sister         per NG: in 3 juvenile 38-58; x3 sisters     So Hobby Hazards: Not Asked        Sleep Concern: Not Asked        Stress Concern: Not Asked        Weight Concern: Not Asked        Special Diet: Not Asked        Back Care: Not Asked        Exercise: Not Asked        Bike Helmet: Not Asked        Seat Belt:

## 2019-04-19 NOTE — H&P
History & Physical Examination    Patient Name: Pietro Pierson  MRN: [id number]  [facility]: [id number]  YOB: 1955    Diagnosis: history of polyps    Medications Prior to Admission:  COUMADIN 5 MG Oral Tab TAKE 1 1/2 TABLETS BY MOUTH EVERY EVENING OR ] [ x]    ABDOMEN [x ] [x ]    UROGENITAL [ ] [ ]    EXTREMITIES [x ] [x ]    OTHER        [ x ] I have discussed the risks and benefits and alternatives with the patient/family. They understand and agree to proceed with plan of care.   [ x ] I have review

## 2019-04-19 NOTE — OR NURSING
Pt refused PT/INR lab draw prior to procedure, states he has result from his home machine he uses to test INR. Dr. Robert Gamma and anesthesia notified.

## 2019-04-24 ENCOUNTER — TELEPHONE (OUTPATIENT)
Dept: GASTROENTEROLOGY | Facility: CLINIC | Age: 64
End: 2019-04-24

## 2019-04-24 NOTE — TELEPHONE ENCOUNTER
Left message on both voice mails to call back for results. Results letter mailed. Recall entered for 4/19/2022.

## 2019-04-24 NOTE — TELEPHONE ENCOUNTER
----- Message from Maura Dos Santos MD sent at 4/22/2019  5:02 PM CDT -----  RN to place on the colon call back for 3 years and mail letter to the pt. Thanks.

## 2019-04-25 ENCOUNTER — OFFICE VISIT (OUTPATIENT)
Dept: INTERNAL MEDICINE CLINIC | Facility: CLINIC | Age: 64
End: 2019-04-25

## 2019-04-25 ENCOUNTER — LAB ENCOUNTER (OUTPATIENT)
Dept: LAB | Facility: HOSPITAL | Age: 64
End: 2019-04-25
Attending: INTERNAL MEDICINE
Payer: COMMERCIAL

## 2019-04-25 VITALS
SYSTOLIC BLOOD PRESSURE: 120 MMHG | RESPIRATION RATE: 16 BRPM | HEIGHT: 71 IN | HEART RATE: 65 BPM | WEIGHT: 200 LBS | DIASTOLIC BLOOD PRESSURE: 76 MMHG | BODY MASS INDEX: 28 KG/M2

## 2019-04-25 DIAGNOSIS — D68.9 CLOTTING DISORDER (HCC): ICD-10-CM

## 2019-04-25 DIAGNOSIS — Z00.00 PHYSICAL EXAM, ANNUAL: ICD-10-CM

## 2019-04-25 DIAGNOSIS — Z00.00 PHYSICAL EXAM, ANNUAL: Primary | ICD-10-CM

## 2019-04-25 PROCEDURE — 81003 URINALYSIS AUTO W/O SCOPE: CPT

## 2019-04-25 PROCEDURE — 99397 PER PM REEVAL EST PAT 65+ YR: CPT | Performed by: INTERNAL MEDICINE

## 2019-04-25 PROCEDURE — 80053 COMPREHEN METABOLIC PANEL: CPT

## 2019-04-25 PROCEDURE — 85025 COMPLETE CBC W/AUTO DIFF WBC: CPT

## 2019-04-25 PROCEDURE — 36415 COLL VENOUS BLD VENIPUNCTURE: CPT

## 2019-04-25 PROCEDURE — 80061 LIPID PANEL: CPT

## 2019-04-25 PROCEDURE — 84443 ASSAY THYROID STIM HORMONE: CPT

## 2019-04-25 RX ORDER — SIMVASTATIN 20 MG
TABLET ORAL
Qty: 90 TABLET | Refills: 3 | Status: SHIPPED | OUTPATIENT
Start: 2019-04-25 | End: 2019-04-25

## 2019-04-25 RX ORDER — SIMVASTATIN 20 MG
TABLET ORAL
Qty: 90 TABLET | Refills: 1 | Status: SHIPPED | OUTPATIENT
Start: 2019-04-25 | End: 2020-01-03

## 2019-04-25 NOTE — TELEPHONE ENCOUNTER
Please review; protocol failed.     Requested Prescriptions     Pending Prescriptions Disp Refills   • SIMVASTATIN 20 MG Oral Tab [Pharmacy Med Name: SIMVASTATIN 20MG TABLETS] 90 tablet 0     Sig: TAKE 1 TABLET BY MOUTH EVERY NIGHT AT BEDTIME         Recent

## 2019-04-25 NOTE — PROGRESS NOTES
Albania Fernandez is a 70year old male who presents for a complete physical exam.   HPI:   Pt complains of nothing./ Had colonoscopy done and had 2 tubular adenomas. There is no immunization history on file for this patient.   Wt Readings from Last 6 Enc Disorder Mother         a fibrillation   • Other (septicemia) Mother         septicemia   • Hypertension Father    • Heart Disorder Father    • Diabetes Sister         per NG: in 3 juvenile 38-58; x3 sisters      Social History:  Social History    Tobacco back is not tender,FROM of the back  EXTREMITIES: no cyanosis, clubbing or edema  NEURO: Oriented times three,cranial nerves are intact,motor and sensory are grossly intact    ASSESSMENT AND PLAN:   1.  Physical exam, annual    Cj Silveira is a 57 year o

## 2019-05-06 ENCOUNTER — MED REC SCAN ONLY (OUTPATIENT)
Dept: INTERNAL MEDICINE CLINIC | Facility: CLINIC | Age: 64
End: 2019-05-06

## 2019-05-06 ENCOUNTER — ANTI-COAG VISIT (OUTPATIENT)
Dept: INTERNAL MEDICINE CLINIC | Facility: CLINIC | Age: 64
End: 2019-05-06

## 2019-05-06 DIAGNOSIS — D68.9 BLOOD CLOTTING DISORDER (HCC): ICD-10-CM

## 2019-05-06 DIAGNOSIS — D68.9 CLOTTING DISORDER (HCC): ICD-10-CM

## 2019-05-06 DIAGNOSIS — D68.59 HYPERCOAGULOPATHY (HCC): ICD-10-CM

## 2019-05-24 ENCOUNTER — TELEPHONE (OUTPATIENT)
Dept: INTERNAL MEDICINE CLINIC | Facility: CLINIC | Age: 64
End: 2019-05-24

## 2019-05-24 DIAGNOSIS — D68.51 ACTIVATED PROTEIN C RESISTANCE (HCC): ICD-10-CM

## 2019-05-24 DIAGNOSIS — Z79.01 ENCOUNTER FOR CURRENT LONG-TERM USE OF ANTICOAGULANTS: Primary | ICD-10-CM

## 2019-05-28 NOTE — TELEPHONE ENCOUNTER
Spoke to [employer]. No form was received correct fax number given.  This is a referral order for his strips for his coumadin testing Will wait fax

## 2019-06-06 ENCOUNTER — TELEPHONE (OUTPATIENT)
Dept: INTERNAL MEDICINE CLINIC | Facility: CLINIC | Age: 64
End: 2019-06-06

## 2019-06-07 ENCOUNTER — ANTI-COAG VISIT (OUTPATIENT)
Dept: INTERNAL MEDICINE CLINIC | Facility: CLINIC | Age: 64
End: 2019-06-07

## 2019-06-07 DIAGNOSIS — D68.59 HYPERCOAGULOPATHY (HCC): ICD-10-CM

## 2019-06-07 DIAGNOSIS — D68.9 BLOOD CLOTTING DISORDER (HCC): ICD-10-CM

## 2019-06-07 DIAGNOSIS — D68.9 CLOTTING DISORDER (HCC): ICD-10-CM

## 2019-06-10 RX ORDER — WARFARIN SODIUM 5 MG/1
TABLET ORAL
Qty: 135 TABLET | Refills: 1 | Status: SHIPPED | OUTPATIENT
Start: 2019-06-10 | End: 2019-11-24

## 2019-06-10 NOTE — TELEPHONE ENCOUNTER
LR 3-19-19 # 135    Review pended refill request as it does not fall under a protocol.   Requested Prescriptions     Pending Prescriptions Disp Refills   • COUMADIN 5 MG Oral Tab [Pharmacy Med Name: COUMADIN 5MG TABLETS (PEACH)] 135 tablet 0     Sig: TAKE 1

## 2019-06-13 ENCOUNTER — TELEPHONE (OUTPATIENT)
Dept: INTERNAL MEDICINE CLINIC | Facility: CLINIC | Age: 64
End: 2019-06-13

## 2019-06-13 NOTE — TELEPHONE ENCOUNTER
[facility] MEDICAL SUPPLIES    COAGUCHEK TEST STRIPS    ALL INFORMATION ON ORDER DATED June 3RD, 2019

## 2019-06-13 NOTE — TELEPHONE ENCOUNTER
LMTCB If patient calls back please transfer to ext [phone]. If not availiable please let patient know that I [facility] and they are requiring a Prior authorization for his coagucheck test strips.  The order was placed and sent to our prior auth department

## 2019-06-14 NOTE — TELEPHONE ENCOUNTER
Patient called stating he is returning call he received yesterday. He states he is following up on status of order for Coaguchek test strips. Chart review shows prior auth sent to [facility].    He is requesting status update about PA for [employer]

## 2019-06-20 NOTE — TELEPHONE ENCOUNTER
S/w pt, he received letter and understands new change in communication that will take effect July 1, 2019

## 2019-07-17 LAB — INR: 2.4 (ref 2–3)

## 2019-07-18 ENCOUNTER — MED REC SCAN ONLY (OUTPATIENT)
Dept: INTERNAL MEDICINE CLINIC | Facility: CLINIC | Age: 64
End: 2019-07-18

## 2019-07-18 ENCOUNTER — ANTI-COAG VISIT (OUTPATIENT)
Dept: INTERNAL MEDICINE CLINIC | Facility: CLINIC | Age: 64
End: 2019-07-18

## 2019-07-18 DIAGNOSIS — D68.59 HYPERCOAGULOPATHY (HCC): ICD-10-CM

## 2019-07-18 DIAGNOSIS — D68.9 BLOOD CLOTTING DISORDER (HCC): ICD-10-CM

## 2019-07-18 DIAGNOSIS — D68.9 CLOTTING DISORDER (HCC): ICD-10-CM

## 2019-07-18 PROCEDURE — 93793 ANTICOAG MGMT PT WARFARIN: CPT | Performed by: INTERNAL MEDICINE

## 2019-08-22 LAB — INR: 1.7 (ref 2–3)

## 2019-08-23 ENCOUNTER — ANTI-COAG VISIT (OUTPATIENT)
Dept: INTERNAL MEDICINE CLINIC | Facility: CLINIC | Age: 64
End: 2019-08-23

## 2019-08-23 DIAGNOSIS — D68.9 CLOTTING DISORDER (HCC): ICD-10-CM

## 2019-08-23 DIAGNOSIS — D68.9 BLOOD CLOTTING DISORDER (HCC): ICD-10-CM

## 2019-08-23 DIAGNOSIS — D68.59 HYPERCOAGULOPATHY (HCC): ICD-10-CM

## 2019-09-16 LAB — INR: 2.5 (ref 2–3)

## 2019-09-18 ENCOUNTER — ANTI-COAG VISIT (OUTPATIENT)
Dept: INTERNAL MEDICINE CLINIC | Facility: CLINIC | Age: 64
End: 2019-09-18

## 2019-09-18 DIAGNOSIS — D68.9 BLOOD CLOTTING DISORDER (HCC): ICD-10-CM

## 2019-09-18 DIAGNOSIS — D68.9 CLOTTING DISORDER (HCC): ICD-10-CM

## 2019-09-18 DIAGNOSIS — D68.59 HYPERCOAGULOPATHY (HCC): ICD-10-CM

## 2019-10-22 ENCOUNTER — ANTI-COAG VISIT (OUTPATIENT)
Dept: INTERNAL MEDICINE CLINIC | Facility: CLINIC | Age: 64
End: 2019-10-22

## 2019-10-22 DIAGNOSIS — D68.59 HYPERCOAGULOPATHY (HCC): ICD-10-CM

## 2019-10-22 DIAGNOSIS — D68.9 CLOTTING DISORDER (HCC): ICD-10-CM

## 2019-10-22 DIAGNOSIS — D68.9 BLOOD CLOTTING DISORDER (HCC): ICD-10-CM

## 2019-11-19 ENCOUNTER — ANTI-COAG VISIT (OUTPATIENT)
Dept: INTERNAL MEDICINE CLINIC | Facility: CLINIC | Age: 64
End: 2019-11-19

## 2019-11-19 DIAGNOSIS — D68.9 BLOOD CLOTTING DISORDER (HCC): ICD-10-CM

## 2019-11-19 DIAGNOSIS — D68.9 CLOTTING DISORDER (HCC): ICD-10-CM

## 2019-11-19 DIAGNOSIS — D68.59 HYPERCOAGULOPATHY (HCC): ICD-10-CM

## 2019-11-19 PROCEDURE — 93793 ANTICOAG MGMT PT WARFARIN: CPT | Performed by: INTERNAL MEDICINE

## 2019-11-25 RX ORDER — WARFARIN SODIUM 5 MG/1
TABLET ORAL
Qty: 135 TABLET | Refills: 1 | Status: SHIPPED | OUTPATIENT
Start: 2019-11-25 | End: 2020-05-28

## 2019-12-26 ENCOUNTER — ANTI-COAG VISIT (OUTPATIENT)
Dept: INTERNAL MEDICINE CLINIC | Facility: CLINIC | Age: 64
End: 2019-12-26

## 2019-12-26 DIAGNOSIS — D68.9 BLOOD CLOTTING DISORDER (HCC): ICD-10-CM

## 2019-12-26 DIAGNOSIS — D68.9 CLOTTING DISORDER (HCC): ICD-10-CM

## 2019-12-26 DIAGNOSIS — D68.59 HYPERCOAGULOPATHY (HCC): ICD-10-CM

## 2019-12-26 PROCEDURE — 93793 ANTICOAG MGMT PT WARFARIN: CPT | Performed by: INTERNAL MEDICINE

## 2020-01-03 RX ORDER — SIMVASTATIN 20 MG
TABLET ORAL
Qty: 90 TABLET | Refills: 1 | Status: SHIPPED | OUTPATIENT
Start: 2020-01-03 | End: 2020-07-08

## 2020-01-04 NOTE — TELEPHONE ENCOUNTER
Refill passed per [address] protocol.     Requested Prescriptions   Pending Prescriptions Disp Refills   • SIMVASTATIN 20 MG Oral Tab [Pharmacy Med Name: SIMVASTATIN 20MG TABLETS] 90 tablet 1     Sig: TAKE 1 TABLET BY MOUTH EVERY NIGHT AT BEDTIME

## 2020-01-22 LAB — INR: 2.1 (ref 0.8–1.2)

## 2020-01-23 ENCOUNTER — ANTI-COAG VISIT (OUTPATIENT)
Dept: INTERNAL MEDICINE CLINIC | Facility: CLINIC | Age: 65
End: 2020-01-23

## 2020-01-23 DIAGNOSIS — D68.59 HYPERCOAGULOPATHY (HCC): ICD-10-CM

## 2020-01-23 DIAGNOSIS — D68.9 BLOOD CLOTTING DISORDER (HCC): ICD-10-CM

## 2020-01-23 DIAGNOSIS — D68.9 CLOTTING DISORDER (HCC): ICD-10-CM

## 2020-01-23 PROCEDURE — 93793 ANTICOAG MGMT PT WARFARIN: CPT | Performed by: INTERNAL MEDICINE

## 2020-02-27 ENCOUNTER — ANTI-COAG VISIT (OUTPATIENT)
Dept: INTERNAL MEDICINE CLINIC | Facility: CLINIC | Age: 65
End: 2020-02-27

## 2020-02-27 DIAGNOSIS — D68.9 BLOOD CLOTTING DISORDER (HCC): ICD-10-CM

## 2020-02-27 DIAGNOSIS — D68.59 HYPERCOAGULOPATHY (HCC): ICD-10-CM

## 2020-02-27 DIAGNOSIS — D68.9 CLOTTING DISORDER (HCC): ICD-10-CM

## 2020-02-27 LAB — INR: 2.1 (ref 0.8–1.2)

## 2020-02-27 PROCEDURE — 93793 ANTICOAG MGMT PT WARFARIN: CPT | Performed by: INTERNAL MEDICINE

## 2020-04-02 ENCOUNTER — ANTI-COAG VISIT (OUTPATIENT)
Dept: INTERNAL MEDICINE CLINIC | Facility: CLINIC | Age: 65
End: 2020-04-02

## 2020-04-02 DIAGNOSIS — D68.9 BLOOD CLOTTING DISORDER (HCC): ICD-10-CM

## 2020-04-02 DIAGNOSIS — D68.59 HYPERCOAGULOPATHY (HCC): ICD-10-CM

## 2020-04-02 DIAGNOSIS — D68.9 CLOTTING DISORDER (HCC): ICD-10-CM

## 2020-04-02 PROCEDURE — 93793 ANTICOAG MGMT PT WARFARIN: CPT | Performed by: INTERNAL MEDICINE

## 2020-04-23 ENCOUNTER — ANTI-COAG VISIT (OUTPATIENT)
Dept: INTERNAL MEDICINE CLINIC | Facility: CLINIC | Age: 65
End: 2020-04-23

## 2020-04-23 DIAGNOSIS — D68.9 CLOTTING DISORDER (HCC): ICD-10-CM

## 2020-04-23 DIAGNOSIS — D68.59 HYPERCOAGULOPATHY (HCC): ICD-10-CM

## 2020-04-23 DIAGNOSIS — D68.9 BLOOD CLOTTING DISORDER (HCC): ICD-10-CM

## 2020-04-23 PROCEDURE — 93793 ANTICOAG MGMT PT WARFARIN: CPT | Performed by: INTERNAL MEDICINE

## 2020-05-28 RX ORDER — WARFARIN SODIUM 5 MG/1
TABLET ORAL
Qty: 135 TABLET | Refills: 1 | Status: SHIPPED | OUTPATIENT
Start: 2020-05-28 | End: 2020-08-24

## 2020-06-04 ENCOUNTER — TELEPHONE (OUTPATIENT)
Dept: INTERNAL MEDICINE CLINIC | Facility: CLINIC | Age: 65
End: 2020-06-04

## 2020-06-04 ENCOUNTER — ANTI-COAG VISIT (OUTPATIENT)
Dept: INTERNAL MEDICINE CLINIC | Facility: CLINIC | Age: 65
End: 2020-06-04

## 2020-06-04 DIAGNOSIS — D68.9 BLOOD CLOTTING DISORDER (HCC): ICD-10-CM

## 2020-06-04 DIAGNOSIS — D68.9 CLOTTING DISORDER (HCC): ICD-10-CM

## 2020-06-04 DIAGNOSIS — D68.59 HYPERCOAGULOPATHY (HCC): ICD-10-CM

## 2020-06-04 DIAGNOSIS — Z79.01 LONG TERM (CURRENT) USE OF ANTICOAGULANTS: ICD-10-CM

## 2020-06-04 DIAGNOSIS — D68.59 HYPERCOAGULOPATHY (HCC): Primary | ICD-10-CM

## 2020-06-04 PROCEDURE — 93793 ANTICOAG MGMT PT WARFARIN: CPT | Performed by: INTERNAL MEDICINE

## 2020-06-05 PROBLEM — Z79.01 LONG TERM (CURRENT) USE OF ANTICOAGULANTS: Status: ACTIVE | Noted: 2020-06-05

## 2020-06-23 ENCOUNTER — ANTI-COAG VISIT (OUTPATIENT)
Dept: INTERNAL MEDICINE CLINIC | Facility: CLINIC | Age: 65
End: 2020-06-23

## 2020-06-23 DIAGNOSIS — D68.9 CLOTTING DISORDER (HCC): ICD-10-CM

## 2020-06-23 DIAGNOSIS — D68.9 BLOOD CLOTTING DISORDER (HCC): ICD-10-CM

## 2020-06-23 DIAGNOSIS — Z79.01 LONG TERM (CURRENT) USE OF ANTICOAGULANTS: ICD-10-CM

## 2020-06-23 DIAGNOSIS — D68.59 HYPERCOAGULOPATHY (HCC): ICD-10-CM

## 2020-06-23 PROCEDURE — 93793 ANTICOAG MGMT PT WARFARIN: CPT | Performed by: INTERNAL MEDICINE

## 2020-07-02 ENCOUNTER — ANTI-COAG VISIT (OUTPATIENT)
Dept: INTERNAL MEDICINE CLINIC | Facility: CLINIC | Age: 65
End: 2020-07-02

## 2020-07-02 ENCOUNTER — TELEPHONE (OUTPATIENT)
Dept: INTERNAL MEDICINE CLINIC | Facility: CLINIC | Age: 65
End: 2020-07-02

## 2020-07-02 ENCOUNTER — LAB ENCOUNTER (OUTPATIENT)
Dept: LAB | Facility: HOSPITAL | Age: 65
End: 2020-07-02
Attending: INTERNAL MEDICINE
Payer: COMMERCIAL

## 2020-07-02 ENCOUNTER — OFFICE VISIT (OUTPATIENT)
Dept: INTERNAL MEDICINE CLINIC | Facility: CLINIC | Age: 65
End: 2020-07-02

## 2020-07-02 VITALS
HEART RATE: 65 BPM | HEIGHT: 71 IN | RESPIRATION RATE: 16 BRPM | WEIGHT: 206 LBS | DIASTOLIC BLOOD PRESSURE: 90 MMHG | SYSTOLIC BLOOD PRESSURE: 146 MMHG | BODY MASS INDEX: 28.84 KG/M2

## 2020-07-02 DIAGNOSIS — Z00.00 PHYSICAL EXAM, ANNUAL: Primary | ICD-10-CM

## 2020-07-02 DIAGNOSIS — D68.9 BLOOD CLOTTING DISORDER (HCC): ICD-10-CM

## 2020-07-02 DIAGNOSIS — D68.59 HYPERCOAGULOPATHY (HCC): ICD-10-CM

## 2020-07-02 DIAGNOSIS — Z00.00 PHYSICAL EXAM, ANNUAL: ICD-10-CM

## 2020-07-02 DIAGNOSIS — Z79.01 LONG TERM (CURRENT) USE OF ANTICOAGULANTS: ICD-10-CM

## 2020-07-02 DIAGNOSIS — L98.9 SKIN LESION OF FACE: ICD-10-CM

## 2020-07-02 DIAGNOSIS — D68.9 CLOTTING DISORDER (HCC): ICD-10-CM

## 2020-07-02 LAB
ALBUMIN SERPL-MCNC: 3.7 G/DL (ref 3.4–5)
ALBUMIN/GLOB SERPL: 1.1 {RATIO} (ref 1–2)
ALP LIVER SERPL-CCNC: 60 U/L (ref 45–117)
ALT SERPL-CCNC: 28 U/L (ref 16–61)
ANION GAP SERPL CALC-SCNC: 5 MMOL/L (ref 0–18)
AST SERPL-CCNC: 19 U/L (ref 15–37)
BASOPHILS # BLD AUTO: 0.06 X10(3) UL (ref 0–0.2)
BASOPHILS NFR BLD AUTO: 1.2 %
BILIRUB SERPL-MCNC: 0.5 MG/DL (ref 0.1–2)
BILIRUB UR QL: NEGATIVE
BUN BLD-MCNC: 9 MG/DL (ref 7–18)
BUN/CREAT SERPL: 9.2 (ref 10–20)
CALCIUM BLD-MCNC: 8.7 MG/DL (ref 8.5–10.1)
CHLORIDE SERPL-SCNC: 107 MMOL/L (ref 98–112)
CHOLEST SMN-MCNC: 151 MG/DL (ref ?–200)
CLARITY UR: CLEAR
CO2 SERPL-SCNC: 30 MMOL/L (ref 21–32)
COLOR UR: YELLOW
COMPLEXED PSA SERPL-MCNC: 1.83 NG/ML (ref ?–4)
CREAT BLD-MCNC: 0.98 MG/DL (ref 0.7–1.3)
DEPRECATED RDW RBC AUTO: 47 FL (ref 35.1–46.3)
EOSINOPHIL # BLD AUTO: 0.26 X10(3) UL (ref 0–0.7)
EOSINOPHIL NFR BLD AUTO: 5.2 %
ERYTHROCYTE [DISTWIDTH] IN BLOOD BY AUTOMATED COUNT: 13.2 % (ref 11–15)
GLOBULIN PLAS-MCNC: 3.4 G/DL (ref 2.8–4.4)
GLUCOSE BLD-MCNC: 93 MG/DL (ref 70–99)
GLUCOSE UR-MCNC: NEGATIVE MG/DL
HCT VFR BLD AUTO: 44 % (ref 39–53)
HDLC SERPL-MCNC: 48 MG/DL (ref 40–59)
HGB BLD-MCNC: 14.8 G/DL (ref 13–17.5)
HGB UR QL STRIP.AUTO: NEGATIVE
IMM GRANULOCYTES # BLD AUTO: 0.01 X10(3) UL (ref 0–1)
IMM GRANULOCYTES NFR BLD: 0.2 %
INR BLD: 3.26 (ref 0.9–1.2)
KETONES UR-MCNC: NEGATIVE MG/DL
LDLC SERPL CALC-MCNC: 81 MG/DL (ref ?–100)
LEUKOCYTE ESTERASE UR QL STRIP.AUTO: NEGATIVE
LYMPHOCYTES # BLD AUTO: 1.57 X10(3) UL (ref 1–4)
LYMPHOCYTES NFR BLD AUTO: 31.5 %
M PROTEIN MFR SERPL ELPH: 7.1 G/DL (ref 6.4–8.2)
MCH RBC QN AUTO: 32.7 PG (ref 26–34)
MCHC RBC AUTO-ENTMCNC: 33.6 G/DL (ref 31–37)
MCV RBC AUTO: 97.1 FL (ref 80–100)
MONOCYTES # BLD AUTO: 0.5 X10(3) UL (ref 0.1–1)
MONOCYTES NFR BLD AUTO: 10 %
NEUTROPHILS # BLD AUTO: 2.58 X10 (3) UL (ref 1.5–7.7)
NEUTROPHILS # BLD AUTO: 2.58 X10(3) UL (ref 1.5–7.7)
NEUTROPHILS NFR BLD AUTO: 51.9 %
NITRITE UR QL STRIP.AUTO: NEGATIVE
NONHDLC SERPL-MCNC: 103 MG/DL (ref ?–130)
OSMOLALITY SERPL CALC.SUM OF ELEC: 292 MOSM/KG (ref 275–295)
PATIENT FASTING Y/N/NP: YES
PATIENT FASTING Y/N/NP: YES
PH UR: 5 [PH] (ref 5–8)
PLATELET # BLD AUTO: 169 10(3)UL (ref 150–450)
POTASSIUM SERPL-SCNC: 4.5 MMOL/L (ref 3.5–5.1)
PROT UR-MCNC: NEGATIVE MG/DL
PROTHROMBIN TIME: 32.7 SECONDS (ref 11.8–14.5)
RBC # BLD AUTO: 4.53 X10(6)UL (ref 3.8–5.8)
SODIUM SERPL-SCNC: 142 MMOL/L (ref 136–145)
SP GR UR STRIP: 1.02 (ref 1–1.03)
TRIGL SERPL-MCNC: 111 MG/DL (ref 30–149)
TSI SER-ACNC: 1.28 MIU/ML (ref 0.36–3.74)
UROBILINOGEN UR STRIP-ACNC: <2
VLDLC SERPL CALC-MCNC: 22 MG/DL (ref 0–30)
WBC # BLD AUTO: 5 X10(3) UL (ref 4–11)

## 2020-07-02 PROCEDURE — 99397 PER PM REEVAL EST PAT 65+ YR: CPT | Performed by: INTERNAL MEDICINE

## 2020-07-02 PROCEDURE — 85610 PROTHROMBIN TIME: CPT

## 2020-07-02 PROCEDURE — 36415 COLL VENOUS BLD VENIPUNCTURE: CPT

## 2020-07-02 PROCEDURE — 80053 COMPREHEN METABOLIC PANEL: CPT

## 2020-07-02 PROCEDURE — 85025 COMPLETE CBC W/AUTO DIFF WBC: CPT

## 2020-07-02 PROCEDURE — 81003 URINALYSIS AUTO W/O SCOPE: CPT

## 2020-07-02 PROCEDURE — 84443 ASSAY THYROID STIM HORMONE: CPT

## 2020-07-02 PROCEDURE — 80061 LIPID PANEL: CPT

## 2020-07-02 NOTE — PROGRESS NOTES
Katt Avendaño is a 77year old male who presents for a complete physical exam.   HPI:   Pt complains of nothing./ Had colonoscopy done and had 2 tubular adenomas. There is no immunization history on file for this patient.   Wt Readings from Last 6 Enc Disorder Mother         a fibrillation   • Other (septicemia) Mother         septicemia   • Hypertension Father    • Heart Disorder Father    • Diabetes Sister         per NG: in 3 juvenile 38-58; x3 sisters      Social History:  Social History    Tobacco negative  MUSCULOSKELETAL: back is not tender,FROM of the back  EXTREMITIES: no cyanosis, clubbing or edema  NEURO: Oriented times three,cranial nerves are intact,motor and sensory are grossly intact    ASSESSMENT AND PLAN:   1.  Physical exam, annual    Da

## 2020-07-02 NOTE — TELEPHONE ENCOUNTER
INR 3.2  goal 2.0-3.0  7/2   INR results received from [employer]. S/w Guillermina Pantoja and instructed to per protocol, decrease  dose 10%, actual 6.3 %. At 7.5 mg weds only and 5 mg rest wek. Lab in 1-2 weeks. He will test when his testing strips arrive.  JUSTEN

## 2020-07-08 RX ORDER — SIMVASTATIN 20 MG
TABLET ORAL
Qty: 90 TABLET | Refills: 1 | Status: SHIPPED | OUTPATIENT
Start: 2020-07-08 | End: 2021-01-12

## 2020-07-14 ENCOUNTER — TELEPHONE (OUTPATIENT)
Dept: INTERNAL MEDICINE CLINIC | Facility: CLINIC | Age: 65
End: 2020-07-14

## 2020-07-14 DIAGNOSIS — D68.9 BLOOD CLOTTING DISORDER (HCC): Primary | ICD-10-CM

## 2020-07-14 DIAGNOSIS — D68.59 HYPERCOAGULOPATHY (HCC): ICD-10-CM

## 2020-07-14 NOTE — TELEPHONE ENCOUNTER
Patient called to notify the Coumadin clinic that he has not been able to take his INR, as he does not have the test strips. A message has been sent to Dr. Jose Miguel Katz to get a refill, but he has not heard back yet.

## 2020-07-14 NOTE — TELEPHONE ENCOUNTER
Called and spoke with Orlando Beth. He explained that a DME order needs to go thru to [address], so that it can be approved by his insurance and then he will receive the testing strips.  TE was entered for pended DME order earlier barbara gonzalez

## 2020-07-14 NOTE — TELEPHONE ENCOUNTER
Patient called, stating that he has not been able to fill the previous prescription because they are not available at his pharmacy.     PT/INR Test In Vitro Strip 24 strip 1 7/2/2020    Sig:   Use strips monthly to check PT/INR as direceted     Route:   (no

## 2020-07-17 NOTE — TELEPHONE ENCOUNTER
Left message for patient, please informed of order approval.     Order has been faxed to [phone]

## 2020-07-30 ENCOUNTER — TELEPHONE (OUTPATIENT)
Dept: INTERNAL MEDICINE CLINIC | Facility: CLINIC | Age: 65
End: 2020-07-30

## 2020-07-30 NOTE — TELEPHONE ENCOUNTER
Red Cerda at [employer] -[phone] called inquiring on DME approval status. Chart reviewed, DME start 7/14/20- expires @ 10/12/20 approved for reference #[id number].

## 2020-08-12 ENCOUNTER — ANTI-COAG VISIT (OUTPATIENT)
Dept: INTERNAL MEDICINE CLINIC | Facility: CLINIC | Age: 65
End: 2020-08-12

## 2020-08-12 ENCOUNTER — TELEPHONE (OUTPATIENT)
Dept: INTERNAL MEDICINE CLINIC | Facility: CLINIC | Age: 65
End: 2020-08-12

## 2020-08-12 DIAGNOSIS — D68.9 BLOOD CLOTTING DISORDER (HCC): ICD-10-CM

## 2020-08-12 DIAGNOSIS — D68.59 HYPERCOAGULOPATHY (HCC): ICD-10-CM

## 2020-08-12 DIAGNOSIS — D68.9 CLOTTING DISORDER (HCC): ICD-10-CM

## 2020-08-12 DIAGNOSIS — Z79.01 LONG TERM (CURRENT) USE OF ANTICOAGULANTS: ICD-10-CM

## 2020-08-12 LAB — INR: 3.3 (ref 0.8–1.2)

## 2020-08-12 PROCEDURE — 93793 ANTICOAG MGMT PT WARFARIN: CPT | Performed by: INTERNAL MEDICINE

## 2020-08-12 NOTE — TELEPHONE ENCOUNTER
Today's INR 3.3  Goal 2.0-3.0    Instructed per protocol, weekly dose decrease by 10%, actual decrease 6.7% and repeat INR in 2 weeks.      Advised patient to check INR every 2 weeks per [employer] guidelines- last INR check was 6 weeks ago- and to manage dosin

## 2020-08-20 ENCOUNTER — TELEPHONE (OUTPATIENT)
Dept: INTERNAL MEDICINE CLINIC | Facility: CLINIC | Age: 65
End: 2020-08-20

## 2020-08-20 DIAGNOSIS — D68.9 CLOTTING DISORDER (HCC): Primary | ICD-10-CM

## 2020-08-20 NOTE — TELEPHONE ENCOUNTER
Anabel from [facility] is requesting a referral for the compression socks. Please fax it to #[phone]      Thank you.

## 2020-08-21 ENCOUNTER — TELEPHONE (OUTPATIENT)
Dept: INTERNAL MEDICINE CLINIC | Facility: CLINIC | Age: 65
End: 2020-08-21

## 2020-08-21 NOTE — TELEPHONE ENCOUNTER
Called [facility] and spoke with Raghav,this nurse was put on hold for few minutes while he is checking the issue. States that he called their warehouse and they do not have or manufacture BRAND  COUMADIN BUT they have the generic  WARFARIN and JANTOVEN =same dose. Dr Rick Lucia=please advise.     Please reply to ebony: KELLY Mandujano

## 2020-08-21 NOTE — TELEPHONE ENCOUNTER
Patient states when he went to refill medication, he was advised that they no longer make them. Patient will like to know if an alternative will be given to him.     COUMADIN 5 MG Oral Tab

## 2020-08-22 NOTE — TELEPHONE ENCOUNTER
jantoven seems to be the brand of warfarin. I believe this is ok to fill.  I will also route to CC for further advise

## 2020-08-24 RX ORDER — WARFARIN SODIUM 5 MG/1
5 TABLET ORAL NIGHTLY
Qty: 90 TABLET | Refills: 1 | Status: SHIPPED | OUTPATIENT
Start: 2020-08-24 | End: 2021-01-11

## 2020-09-08 LAB — INR: 3 (ref 0.8–1.2)

## 2020-09-11 ENCOUNTER — ANTI-COAG VISIT (OUTPATIENT)
Dept: INTERNAL MEDICINE CLINIC | Facility: CLINIC | Age: 65
End: 2020-09-11

## 2020-09-11 DIAGNOSIS — D68.59 HYPERCOAGULOPATHY (HCC): ICD-10-CM

## 2020-09-11 DIAGNOSIS — D68.9 BLOOD CLOTTING DISORDER (HCC): ICD-10-CM

## 2020-09-11 DIAGNOSIS — Z79.01 LONG TERM (CURRENT) USE OF ANTICOAGULANTS: ICD-10-CM

## 2020-09-11 DIAGNOSIS — D68.9 CLOTTING DISORDER (HCC): ICD-10-CM

## 2020-09-11 PROCEDURE — 93793 ANTICOAG MGMT PT WARFARIN: CPT | Performed by: INTERNAL MEDICINE

## 2020-10-13 ENCOUNTER — ANTI-COAG VISIT (OUTPATIENT)
Dept: INTERNAL MEDICINE CLINIC | Facility: CLINIC | Age: 65
End: 2020-10-13

## 2020-10-13 DIAGNOSIS — D68.9 CLOTTING DISORDER (HCC): ICD-10-CM

## 2020-10-13 DIAGNOSIS — D68.59 HYPERCOAGULOPATHY (HCC): ICD-10-CM

## 2020-10-13 DIAGNOSIS — D68.9 BLOOD CLOTTING DISORDER (HCC): ICD-10-CM

## 2020-10-13 DIAGNOSIS — Z79.01 LONG TERM (CURRENT) USE OF ANTICOAGULANTS: ICD-10-CM

## 2020-10-19 ENCOUNTER — MED REC SCAN ONLY (OUTPATIENT)
Dept: INTERNAL MEDICINE CLINIC | Facility: CLINIC | Age: 65
End: 2020-10-19

## 2020-11-19 ENCOUNTER — ANTI-COAG VISIT (OUTPATIENT)
Dept: INTERNAL MEDICINE CLINIC | Facility: CLINIC | Age: 65
End: 2020-11-19

## 2020-11-19 DIAGNOSIS — D68.9 BLOOD CLOTTING DISORDER (HCC): ICD-10-CM

## 2020-11-19 DIAGNOSIS — D68.59 HYPERCOAGULOPATHY (HCC): ICD-10-CM

## 2020-11-19 DIAGNOSIS — Z79.01 LONG TERM (CURRENT) USE OF ANTICOAGULANTS: ICD-10-CM

## 2020-11-19 DIAGNOSIS — D68.9 CLOTTING DISORDER (HCC): ICD-10-CM

## 2020-11-19 PROCEDURE — 93793 ANTICOAG MGMT PT WARFARIN: CPT | Performed by: INTERNAL MEDICINE

## 2020-12-11 ENCOUNTER — TELEPHONE (OUTPATIENT)
Dept: INTERNAL MEDICINE CLINIC | Facility: CLINIC | Age: 65
End: 2020-12-11

## 2020-12-11 DIAGNOSIS — D68.9 CLOTTING DISORDER (HCC): Primary | ICD-10-CM

## 2020-12-11 NOTE — TELEPHONE ENCOUNTER
Spoke with Dieudonne Seo. Requesting DME referral to be signed so that he may obtain home INR testing strips. Reports he reached out to the office 2 weeks ago- I do not see a TE related to this question.

## 2020-12-11 NOTE — TELEPHONE ENCOUNTER
Patient states there is a form for his pt/inr strips that he needs filled and sent back to Dinesh Gupta Dr. States he believes it goes through the coumadin clinic. Please advise.

## 2020-12-21 ENCOUNTER — ANTI-COAG VISIT (OUTPATIENT)
Dept: INTERNAL MEDICINE CLINIC | Facility: CLINIC | Age: 65
End: 2020-12-21

## 2020-12-21 DIAGNOSIS — D68.9 BLOOD CLOTTING DISORDER (HCC): ICD-10-CM

## 2020-12-21 DIAGNOSIS — D68.59 HYPERCOAGULOPATHY (HCC): ICD-10-CM

## 2020-12-21 DIAGNOSIS — Z79.01 LONG TERM (CURRENT) USE OF ANTICOAGULANTS: ICD-10-CM

## 2020-12-21 DIAGNOSIS — D68.9 CLOTTING DISORDER (HCC): ICD-10-CM

## 2020-12-29 NOTE — TELEPHONE ENCOUNTER
Pt. Calling to f/up on getting PT INR Test Strips - by [employer] - 6 in vial in box. Pt. States that he is down to his last strip. Pt. States that he has been trying to get strips since November.

## 2021-01-07 ENCOUNTER — TELEPHONE (OUTPATIENT)
Dept: INTERNAL MEDICINE CLINIC | Facility: CLINIC | Age: 66
End: 2021-01-07

## 2021-01-07 DIAGNOSIS — I82.411 THROMBOSIS OF RIGHT FEMORAL VEIN (HCC): Primary | ICD-10-CM

## 2021-01-07 NOTE — TELEPHONE ENCOUNTER
Zoran is requesting an updated referral with DX code I82.41 and [facility] codes quantity of 2  and [facility] code quantity 1 .      Fax number [phone]

## 2021-01-11 RX ORDER — WARFARIN SODIUM 5 MG/1
TABLET ORAL
Qty: 90 TABLET | Refills: 1 | Status: SHIPPED | OUTPATIENT
Start: 2021-01-11 | End: 2021-07-29

## 2021-01-12 RX ORDER — SIMVASTATIN 20 MG
20 TABLET ORAL NIGHTLY
Qty: 90 TABLET | Refills: 1 | Status: SHIPPED | OUTPATIENT
Start: 2021-01-12 | End: 2021-07-29

## 2021-01-12 NOTE — TELEPHONE ENCOUNTER
Current Outpatient Medications:   •  SIMVASTATIN 20 MG Oral Tab, TAKE 1 TABLET BY MOUTH EVERY NIGHT AT BEDTIME, Disp: 90 tablet, Rfl: 1

## 2021-01-18 NOTE — TELEPHONE ENCOUNTER
Left message for Cruzito Kendall from [facility], Referral # [id number] is OPEN status.

## 2021-01-26 ENCOUNTER — ANTI-COAG VISIT (OUTPATIENT)
Dept: INTERNAL MEDICINE CLINIC | Facility: CLINIC | Age: 66
End: 2021-01-26

## 2021-01-26 DIAGNOSIS — D68.9 CLOTTING DISORDER (HCC): ICD-10-CM

## 2021-01-26 DIAGNOSIS — Z79.01 LONG TERM (CURRENT) USE OF ANTICOAGULANTS: ICD-10-CM

## 2021-01-26 DIAGNOSIS — D68.9 BLOOD CLOTTING DISORDER (HCC): ICD-10-CM

## 2021-01-26 DIAGNOSIS — D68.59 HYPERCOAGULOPATHY (HCC): ICD-10-CM

## 2021-01-26 LAB — INR: 2.3 (ref 0.8–1.2)

## 2021-01-26 PROCEDURE — 93793 ANTICOAG MGMT PT WARFARIN: CPT | Performed by: INTERNAL MEDICINE

## 2021-02-24 LAB — INR: 2.4 (ref 0.8–1.2)

## 2021-02-25 ENCOUNTER — ANTI-COAG VISIT (OUTPATIENT)
Dept: INTERNAL MEDICINE CLINIC | Facility: CLINIC | Age: 66
End: 2021-02-25

## 2021-02-25 DIAGNOSIS — D68.9 CLOTTING DISORDER (HCC): ICD-10-CM

## 2021-02-25 DIAGNOSIS — D68.59 HYPERCOAGULOPATHY (HCC): ICD-10-CM

## 2021-02-25 DIAGNOSIS — D68.9 BLOOD CLOTTING DISORDER (HCC): ICD-10-CM

## 2021-02-25 DIAGNOSIS — Z79.01 LONG TERM (CURRENT) USE OF ANTICOAGULANTS: ICD-10-CM

## 2021-02-25 PROCEDURE — 93793 ANTICOAG MGMT PT WARFARIN: CPT | Performed by: INTERNAL MEDICINE

## 2021-03-12 DIAGNOSIS — Z23 NEED FOR VACCINATION: ICD-10-CM

## 2021-03-27 ENCOUNTER — IMMUNIZATION (OUTPATIENT)
Dept: LAB | Age: 66
End: 2021-03-27
Attending: HOSPITALIST
Payer: COMMERCIAL

## 2021-03-27 DIAGNOSIS — Z23 NEED FOR VACCINATION: Primary | ICD-10-CM

## 2021-03-27 PROCEDURE — 0001A SARSCOV2 VAC 30MCG/0.3ML IM: CPT

## 2021-03-30 LAB — INR: 2.7 (ref 0.8–1.2)

## 2021-03-31 ENCOUNTER — ANTI-COAG VISIT (OUTPATIENT)
Dept: INTERNAL MEDICINE CLINIC | Facility: CLINIC | Age: 66
End: 2021-03-31

## 2021-03-31 DIAGNOSIS — D68.9 CLOTTING DISORDER (HCC): ICD-10-CM

## 2021-03-31 DIAGNOSIS — Z79.01 LONG TERM (CURRENT) USE OF ANTICOAGULANTS: ICD-10-CM

## 2021-03-31 DIAGNOSIS — D68.9 BLOOD CLOTTING DISORDER (HCC): ICD-10-CM

## 2021-03-31 DIAGNOSIS — D68.59 HYPERCOAGULOPATHY (HCC): ICD-10-CM

## 2021-03-31 PROCEDURE — 93793 ANTICOAG MGMT PT WARFARIN: CPT | Performed by: INTERNAL MEDICINE

## 2021-04-16 NOTE — TELEPHONE ENCOUNTER
Anticoag referral . Order pended. Please sign, thank you. [Feeling Poorly] : not feeling poorly (malaise) [Fever] : no fever [Wgt Loss (___ Lbs)] : no recent weight loss [Pallor] : not pale [Eye Discharge] : no eye discharge [Redness] : no redness [Change in Vision] : no change in vision [Nasal Stuffiness] : no nasal congestion [Sore Throat] : no sore throat [Earache] : no earache [Loss Of Hearing] : no hearing loss [Cyanosis] : no cyanosis [Edema] : no edema [Diaphoresis] : not diaphoretic [Chest Pain] : no chest pain or discomfort [Exercise Intolerance] : no persistence of exercise intolerance [Palpitations] : no palpitations [Orthopnea] : no orthopnea [Fast HR] : no tachycardia [Tachypnea] : not tachypneic [Wheezing] : no wheezing [Cough] : no cough [Shortness Of Breath] : not expressed as feeling short of breath [Vomiting] : no vomiting [Diarrhea] : no diarrhea [Abdominal Pain] : no abdominal pain [Decrease In Appetite] : appetite not decreased [Fainting (Syncope)] : no fainting [Seizure] : no seizures [Headache] : no headache [Dizziness] : no dizziness [Limping] : no limping [Joint Pains] : no arthralgias [Joint Swelling] : no joint swelling [Rash] : no rash [Wound problems] : no wound problems [Easy Bruising] : no tendency for easy bruising [Swollen Glands] : no lymphadenopathy [Easy Bleeding] : no ~M tendency for easy bleeding [Nosebleeds] : no epistaxis [Sleep Disturbances] : ~T no sleep disturbances [Hyperactive] : no hyperactive behavior [Depression] : no depression [Anxiety] : no anxiety [Failure To Thrive] : no failure to thrive [Short Stature] : short stature was not noted [Jitteriness] : no jitteriness [Heat/Cold Intolerance] : no temperature intolerance [Dec Urine Output] : no oliguria

## 2021-04-17 ENCOUNTER — IMMUNIZATION (OUTPATIENT)
Dept: LAB | Age: 66
End: 2021-04-17
Attending: HOSPITALIST
Payer: COMMERCIAL

## 2021-04-17 DIAGNOSIS — Z23 NEED FOR VACCINATION: Primary | ICD-10-CM

## 2021-04-17 PROCEDURE — 0002A SARSCOV2 VAC 30MCG/0.3ML IM: CPT

## 2021-04-29 ENCOUNTER — TELEPHONE (OUTPATIENT)
Dept: INTERNAL MEDICINE CLINIC | Facility: CLINIC | Age: 66
End: 2021-04-29

## 2021-04-29 ENCOUNTER — ANTI-COAG VISIT (OUTPATIENT)
Dept: INTERNAL MEDICINE CLINIC | Facility: CLINIC | Age: 66
End: 2021-04-29

## 2021-04-29 DIAGNOSIS — D68.59 HYPERCOAGULOPATHY (HCC): ICD-10-CM

## 2021-04-29 DIAGNOSIS — Z79.01 LONG TERM (CURRENT) USE OF ANTICOAGULANTS: ICD-10-CM

## 2021-04-29 DIAGNOSIS — D68.9 BLOOD CLOTTING DISORDER (HCC): ICD-10-CM

## 2021-04-29 DIAGNOSIS — D68.9 CLOTTING DISORDER (HCC): ICD-10-CM

## 2021-04-29 PROCEDURE — 93793 ANTICOAG MGMT PT WARFARIN: CPT | Performed by: INTERNAL MEDICINE

## 2021-05-18 ENCOUNTER — MED REC SCAN ONLY (OUTPATIENT)
Dept: INTERNAL MEDICINE CLINIC | Facility: CLINIC | Age: 66
End: 2021-05-18

## 2021-05-27 ENCOUNTER — ANTI-COAG VISIT (OUTPATIENT)
Dept: INTERNAL MEDICINE CLINIC | Facility: CLINIC | Age: 66
End: 2021-05-27

## 2021-05-27 DIAGNOSIS — D68.9 BLOOD CLOTTING DISORDER (HCC): ICD-10-CM

## 2021-05-27 DIAGNOSIS — D68.59 HYPERCOAGULOPATHY (HCC): ICD-10-CM

## 2021-05-27 DIAGNOSIS — Z79.01 LONG TERM (CURRENT) USE OF ANTICOAGULANTS: ICD-10-CM

## 2021-05-27 DIAGNOSIS — D68.9 CLOTTING DISORDER (HCC): ICD-10-CM

## 2021-05-27 PROCEDURE — 93793 ANTICOAG MGMT PT WARFARIN: CPT | Performed by: INTERNAL MEDICINE

## 2021-06-25 ENCOUNTER — TELEPHONE (OUTPATIENT)
Dept: INTERNAL MEDICINE CLINIC | Facility: CLINIC | Age: 66
End: 2021-06-25

## 2021-06-25 ENCOUNTER — ANTI-COAG VISIT (OUTPATIENT)
Dept: INTERNAL MEDICINE CLINIC | Facility: CLINIC | Age: 66
End: 2021-06-25

## 2021-06-25 DIAGNOSIS — Z79.01 LONG TERM (CURRENT) USE OF ANTICOAGULANTS: ICD-10-CM

## 2021-06-25 DIAGNOSIS — D68.9 BLOOD CLOTTING DISORDER (HCC): ICD-10-CM

## 2021-06-25 DIAGNOSIS — Z51.81 ENCOUNTER FOR THERAPEUTIC DRUG MONITORING: ICD-10-CM

## 2021-06-25 DIAGNOSIS — Z79.01 LONG TERM (CURRENT) USE OF ANTICOAGULANTS: Primary | ICD-10-CM

## 2021-06-25 DIAGNOSIS — D68.59 HYPERCOAGULOPATHY (HCC): ICD-10-CM

## 2021-06-25 DIAGNOSIS — D68.9 CLOTTING DISORDER (HCC): ICD-10-CM

## 2021-06-25 PROCEDURE — 93793 ANTICOAG MGMT PT WARFARIN: CPT | Performed by: INTERNAL MEDICINE

## 2021-06-25 NOTE — TELEPHONE ENCOUNTER
Anticoag referral . Today's INR Therapeutic at 2.9. Next INR 1 week per home monitoring agreement. Order pended. Please sign, thank you.

## 2021-07-27 ENCOUNTER — ANTI-COAG VISIT (OUTPATIENT)
Dept: INTERNAL MEDICINE CLINIC | Facility: CLINIC | Age: 66
End: 2021-07-27

## 2021-07-27 DIAGNOSIS — D68.59 HYPERCOAGULOPATHY (HCC): ICD-10-CM

## 2021-07-27 DIAGNOSIS — D68.9 BLOOD CLOTTING DISORDER (HCC): ICD-10-CM

## 2021-07-27 DIAGNOSIS — Z79.01 LONG TERM (CURRENT) USE OF ANTICOAGULANTS: ICD-10-CM

## 2021-07-27 DIAGNOSIS — D68.9 CLOTTING DISORDER (HCC): ICD-10-CM

## 2021-07-27 LAB — INR: 2.7 (ref 0.8–1.2)

## 2021-07-27 PROCEDURE — 93793 ANTICOAG MGMT PT WARFARIN: CPT | Performed by: INTERNAL MEDICINE

## 2021-07-29 ENCOUNTER — TELEPHONE (OUTPATIENT)
Dept: INTERNAL MEDICINE CLINIC | Facility: CLINIC | Age: 66
End: 2021-07-29

## 2021-07-29 ENCOUNTER — OFFICE VISIT (OUTPATIENT)
Dept: INTERNAL MEDICINE CLINIC | Facility: CLINIC | Age: 66
End: 2021-07-29

## 2021-07-29 ENCOUNTER — LAB ENCOUNTER (OUTPATIENT)
Dept: LAB | Facility: HOSPITAL | Age: 66
End: 2021-07-29
Attending: INTERNAL MEDICINE
Payer: COMMERCIAL

## 2021-07-29 VITALS
WEIGHT: 216 LBS | DIASTOLIC BLOOD PRESSURE: 90 MMHG | HEART RATE: 84 BPM | SYSTOLIC BLOOD PRESSURE: 150 MMHG | OXYGEN SATURATION: 98 % | RESPIRATION RATE: 14 BRPM | BODY MASS INDEX: 30.24 KG/M2 | HEIGHT: 71 IN

## 2021-07-29 DIAGNOSIS — I87.001 POST-THROMBOTIC SYNDROME OF RIGHT LOWER EXTREMITY: Primary | ICD-10-CM

## 2021-07-29 DIAGNOSIS — D68.59 HYPERCOAGULOPATHY (HCC): ICD-10-CM

## 2021-07-29 DIAGNOSIS — I87.001 POST-THROMBOTIC SYNDROME OF RIGHT LOWER EXTREMITY: ICD-10-CM

## 2021-07-29 DIAGNOSIS — Z00.00 ADULT GENERAL MEDICAL EXAM: ICD-10-CM

## 2021-07-29 DIAGNOSIS — Z79.01 LONG TERM (CURRENT) USE OF ANTICOAGULANTS: ICD-10-CM

## 2021-07-29 DIAGNOSIS — Z00.00 ADULT GENERAL MEDICAL EXAM: Primary | ICD-10-CM

## 2021-07-29 DIAGNOSIS — I82.411 THROMBOSIS OF RIGHT FEMORAL VEIN (HCC): ICD-10-CM

## 2021-07-29 DIAGNOSIS — L72.0 CYST OF SKIN AND SUBCUTANEOUS TISSUE: ICD-10-CM

## 2021-07-29 PROBLEM — E78.5 DYSLIPIDEMIA: Status: ACTIVE | Noted: 2021-07-29

## 2021-07-29 PROBLEM — I10 ESSENTIAL HYPERTENSION: Status: ACTIVE | Noted: 2021-07-29

## 2021-07-29 LAB
ALBUMIN SERPL-MCNC: 3.8 G/DL (ref 3.4–5)
ALBUMIN/GLOB SERPL: 1.2 {RATIO} (ref 1–2)
ALP LIVER SERPL-CCNC: 55 U/L
ALT SERPL-CCNC: 31 U/L
ANION GAP SERPL CALC-SCNC: 6 MMOL/L (ref 0–18)
AST SERPL-CCNC: 22 U/L (ref 15–37)
BILIRUB SERPL-MCNC: 0.6 MG/DL (ref 0.1–2)
BUN BLD-MCNC: 10 MG/DL (ref 7–18)
BUN/CREAT SERPL: 9.9 (ref 10–20)
CALCIUM BLD-MCNC: 9.1 MG/DL (ref 8.5–10.1)
CHLORIDE SERPL-SCNC: 108 MMOL/L (ref 98–112)
CHOLEST SMN-MCNC: 157 MG/DL (ref ?–200)
CO2 SERPL-SCNC: 27 MMOL/L (ref 21–32)
COMPLEXED PSA SERPL-MCNC: 2.03 NG/ML (ref ?–4)
CREAT BLD-MCNC: 1.01 MG/DL
DEPRECATED RDW RBC AUTO: 45.8 FL (ref 35.1–46.3)
ERYTHROCYTE [DISTWIDTH] IN BLOOD BY AUTOMATED COUNT: 12.4 % (ref 11–15)
EST. AVERAGE GLUCOSE BLD GHB EST-MCNC: 120 MG/DL (ref 68–126)
GLOBULIN PLAS-MCNC: 3.3 G/DL (ref 2.8–4.4)
GLUCOSE BLD-MCNC: 105 MG/DL (ref 70–99)
HBA1C MFR BLD HPLC: 5.8 % (ref ?–5.7)
HCT VFR BLD AUTO: 42.9 %
HDLC SERPL-MCNC: 47 MG/DL (ref 40–59)
HGB BLD-MCNC: 14.5 G/DL
LDLC SERPL CALC-MCNC: 82 MG/DL (ref ?–100)
M PROTEIN MFR SERPL ELPH: 7.1 G/DL (ref 6.4–8.2)
MCH RBC QN AUTO: 33.6 PG (ref 26–34)
MCHC RBC AUTO-ENTMCNC: 33.8 G/DL (ref 31–37)
MCV RBC AUTO: 99.5 FL
NONHDLC SERPL-MCNC: 110 MG/DL (ref ?–130)
OSMOLALITY SERPL CALC.SUM OF ELEC: 291 MOSM/KG (ref 275–295)
PATIENT FASTING Y/N/NP: YES
PATIENT FASTING Y/N/NP: YES
PLATELET # BLD AUTO: 156 10(3)UL (ref 150–450)
POTASSIUM SERPL-SCNC: 4.2 MMOL/L (ref 3.5–5.1)
RBC # BLD AUTO: 4.31 X10(6)UL
SODIUM SERPL-SCNC: 141 MMOL/L (ref 136–145)
TRIGL SERPL-MCNC: 162 MG/DL (ref 30–149)
TSI SER-ACNC: 1.09 MIU/ML (ref 0.36–3.74)
VLDLC SERPL CALC-MCNC: 26 MG/DL (ref 0–30)
WBC # BLD AUTO: 5.8 X10(3) UL (ref 4–11)

## 2021-07-29 PROCEDURE — 80053 COMPREHEN METABOLIC PANEL: CPT

## 2021-07-29 PROCEDURE — 80061 LIPID PANEL: CPT

## 2021-07-29 PROCEDURE — 85027 COMPLETE CBC AUTOMATED: CPT

## 2021-07-29 PROCEDURE — 3080F DIAST BP >= 90 MM HG: CPT | Performed by: INTERNAL MEDICINE

## 2021-07-29 PROCEDURE — 99397 PER PM REEVAL EST PAT 65+ YR: CPT | Performed by: INTERNAL MEDICINE

## 2021-07-29 PROCEDURE — 3008F BODY MASS INDEX DOCD: CPT | Performed by: INTERNAL MEDICINE

## 2021-07-29 PROCEDURE — 83036 HEMOGLOBIN GLYCOSYLATED A1C: CPT

## 2021-07-29 PROCEDURE — 84443 ASSAY THYROID STIM HORMONE: CPT

## 2021-07-29 PROCEDURE — 36415 COLL VENOUS BLD VENIPUNCTURE: CPT

## 2021-07-29 PROCEDURE — 3077F SYST BP >= 140 MM HG: CPT | Performed by: INTERNAL MEDICINE

## 2021-07-29 RX ORDER — SIMVASTATIN 20 MG
20 TABLET ORAL NIGHTLY
Qty: 90 TABLET | Refills: 1 | Status: SHIPPED | OUTPATIENT
Start: 2021-07-29 | End: 2021-10-22

## 2021-07-29 RX ORDER — AMLODIPINE BESYLATE 10 MG/1
10 TABLET ORAL DAILY
Qty: 90 TABLET | Refills: 0 | Status: SHIPPED | OUTPATIENT
Start: 2021-07-29 | End: 2021-10-10

## 2021-07-29 RX ORDER — WARFARIN SODIUM 5 MG/1
5 TABLET ORAL NIGHTLY
Qty: 90 TABLET | Refills: 1 | Status: SHIPPED | OUTPATIENT
Start: 2021-07-29

## 2021-07-29 NOTE — TELEPHONE ENCOUNTER
Patient calling to advised his coumadin information will be now sent to Dr. Nadine Sharif and the doctor needs the procedure information from [facility], please call patient when this has been done.

## 2021-07-29 NOTE — TELEPHONE ENCOUNTER
Spoke with Minda, he is reporting having difficulty getting his testing strips from [facility] a supplier of INR strips for [employer]. He has an HMO and this could be where the hold up is.  He calls [facility] for strips who contacts the insurance company who he

## 2021-07-29 NOTE — PROGRESS NOTES
Deonte Patient is a 74year old male. 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 HENT: Negative for congestion, ear pain, nosebleeds and sore throat. No murmur heard. Pulmonary:      Effort: Pulmonary effort is normal. No respiratory distress. Breath sounds: Normal breath sounds. No wheezing or rales. Abdominal:      General: Bowel sounds are normal.      Palpations: Abdomen is soft.       Ten hypercoagulable state-continue Coumadin. Follows up in the Coumadin clinic    Dyslipidemia on statins    Hypertension-his blood pressure is running high. I have started him on amlodipine. Discussed low-salt diet.   Instructed patient to have a follow-up

## 2021-08-02 NOTE — TELEPHONE ENCOUNTER
Dr. Sarah Croft, I'm attempting to assist Erik Kelly in getting home monitoring up and running. New order for [address] sent for you to sign.

## 2021-08-02 NOTE — TELEPHONE ENCOUNTER
Dr. Vito Caban signed referral for DME (see below) new order for [facility] signed and faxed with face sheet to [facility] as his PCP changed from Dr. Merline Rather to Dr. Vito Caban.    Maretta Aschoff with Xiomara Pandey from [facility], she will have managed care specialist call back to

## 2021-08-02 NOTE — TELEPHONE ENCOUNTER
Dr. Jim De La Torre, I believe De's issue with getting test strips is due to having an HMO. Please sign a referral, I have pended one below.       Referred to: Babar Gonzalez [id number]  (TEL: [phone])  Referred for:    Diagnoses:______add dx________

## 2021-08-06 ENCOUNTER — MED REC SCAN ONLY (OUTPATIENT)
Dept: INTERNAL MEDICINE CLINIC | Facility: CLINIC | Age: 66
End: 2021-08-06

## 2021-08-06 NOTE — TELEPHONE ENCOUNTER
Reviewed steps with Aneesh Kenny at [facility].  She notes that [employer] does the billing and [employer] out the supplies and posts the INRs  Process includes:  Clinic submits order to [facility] who sends info to  NANCY ann who checks the insurance as 3rd party provide

## 2021-08-09 ENCOUNTER — TELEPHONE (OUTPATIENT)
Dept: INTERNAL MEDICINE CLINIC | Facility: CLINIC | Age: 66
End: 2021-08-09

## 2021-08-09 DIAGNOSIS — D68.51 ACTIVATED PROTEIN C RESISTANCE (HCC): ICD-10-CM

## 2021-08-09 DIAGNOSIS — Z79.01 LONG TERM (CURRENT) USE OF ANTICOAGULANTS: Primary | ICD-10-CM

## 2021-08-13 ENCOUNTER — NURSE ONLY (OUTPATIENT)
Dept: INTERNAL MEDICINE CLINIC | Facility: CLINIC | Age: 66
End: 2021-08-13

## 2021-08-13 VITALS — HEART RATE: 67 BPM | SYSTOLIC BLOOD PRESSURE: 127 MMHG | DIASTOLIC BLOOD PRESSURE: 77 MMHG

## 2021-08-13 DIAGNOSIS — I10 ESSENTIAL HYPERTENSION: Primary | ICD-10-CM

## 2021-08-13 PROCEDURE — 3074F SYST BP LT 130 MM HG: CPT | Performed by: INTERNAL MEDICINE

## 2021-08-13 PROCEDURE — 3078F DIAST BP <80 MM HG: CPT | Performed by: INTERNAL MEDICINE

## 2021-08-13 NOTE — PROGRESS NOTES
Patient came in for nurse visit, b/p check. I verified with pcp orders and patients name/. I took b/p on right arm, reading was 127/77 with pulse of 67. Advised pt to continue same medication until next visit with pcp.

## 2021-08-16 NOTE — TELEPHONE ENCOUNTER
S/w Frankie at [employer] who transferred me to [employer]. She will follow back up with insurance , and verify if anything else is needed by tomorrow.

## 2021-08-19 NOTE — TELEPHONE ENCOUNTER
Dr. Beto Valenzuela staff, please give patient an update on DME: strips for INR monitor. Per [employer]) the 's office needs to call [facility] billing department. Their number is [phone] ext [phone].

## 2021-08-19 NOTE — TELEPHONE ENCOUNTER
Patient requesting to know why is taking so long for his medical supplies to be processed. Patient requesting to speak with coumadin for an update or what is holding up his orders from being approved. Please advise.

## 2021-08-23 ENCOUNTER — TELEPHONE (OUTPATIENT)
Dept: INTERNAL MEDICINE CLINIC | Facility: CLINIC | Age: 66
End: 2021-08-23

## 2021-08-23 NOTE — TELEPHONE ENCOUNTER
Patient is requesting a refill for strips for PT/INR. The pharmacy needs it to be corrected for patient for a no charge. Patient uses [facility]. Please advise.     PT/INR Test In Vitro Strip 24 strip 1 7/2/2020    Sig:   Use strips monthly to check PT/IN

## 2021-08-26 ENCOUNTER — ANTI-COAG VISIT (OUTPATIENT)
Dept: INTERNAL MEDICINE CLINIC | Facility: CLINIC | Age: 66
End: 2021-08-26

## 2021-08-26 DIAGNOSIS — D68.9 BLOOD CLOTTING DISORDER (HCC): ICD-10-CM

## 2021-08-26 DIAGNOSIS — Z79.01 LONG TERM (CURRENT) USE OF ANTICOAGULANTS: ICD-10-CM

## 2021-08-26 DIAGNOSIS — D68.59 HYPERCOAGULOPATHY (HCC): ICD-10-CM

## 2021-08-26 LAB — INR: 2.4 (ref 0.8–1.2)

## 2021-08-26 PROCEDURE — 93793 ANTICOAG MGMT PT WARFARIN: CPT | Performed by: INTERNAL MEDICINE

## 2021-08-26 NOTE — TELEPHONE ENCOUNTER
Shen Martin 2 days ago   FK  The health plan is having glitches in the system. I resent this to the health plan on the 16th.      Thank you,   [employer] text

## 2021-08-26 NOTE — TELEPHONE ENCOUNTER
This issue has been turned over to Dr. Osmany Souza staff. See further Denia on 8/9/21 and 8/23 on this matter. Karen Quintana 2 days ago   FK  The health plan is having glitches in the system. I resent this to the health plan on the 16th.

## 2021-09-02 NOTE — TELEPHONE ENCOUNTER
Referral has been faxed to Dinesh Gupta Dr to [phone]    Spoke to patient and informed him that his referral has been authorized and faxed to [address]

## 2021-09-16 ENCOUNTER — TELEPHONE (OUTPATIENT)
Dept: INTERNAL MEDICINE CLINIC | Facility: CLINIC | Age: 66
End: 2021-09-16

## 2021-09-16 DIAGNOSIS — I87.001 POST-THROMBOTIC SYNDROME OF RIGHT LOWER EXTREMITY: Primary | ICD-10-CM

## 2021-09-16 NOTE — TELEPHONE ENCOUNTER
Bhavna from [employer] stating that the Code and quantity on referral for pts compression stocking is incorrect. Should be  REF#:[id number]    PMCR:   Quantity:3  Fax#: [phone]    Please advise.

## 2021-09-22 ENCOUNTER — ANTI-COAG VISIT (OUTPATIENT)
Dept: INTERNAL MEDICINE CLINIC | Facility: CLINIC | Age: 66
End: 2021-09-22

## 2021-09-22 DIAGNOSIS — Z79.01 LONG TERM (CURRENT) USE OF ANTICOAGULANTS: ICD-10-CM

## 2021-09-22 DIAGNOSIS — D68.59 HYPERCOAGULOPATHY (HCC): ICD-10-CM

## 2021-09-22 DIAGNOSIS — D68.9 BLOOD CLOTTING DISORDER (HCC): ICD-10-CM

## 2021-09-22 LAB — INR: 2.2 (ref 0.8–1.2)

## 2021-09-22 PROCEDURE — 93793 ANTICOAG MGMT PT WARFARIN: CPT | Performed by: INTERNAL MEDICINE

## 2021-09-22 NOTE — TELEPHONE ENCOUNTER
Called patient to check if he has received his INR testing strips as he is due for an INR check. Phone took some time to connect, then rang and then a busy signal- unable to leave a message. Patient does not have a current verbal release form on file.

## 2021-09-22 NOTE — TELEPHONE ENCOUNTER
Spoke with Srinath Thakur. States that he has received his INR testing strips. He tested INR last Saturday 9/18= 2.2, he will call in the result to [employer] by this weekend.  States that next time he needs supplies, he will contact the PCP office first for the referral

## 2021-10-04 NOTE — TELEPHONE ENCOUNTER
Message has been sent to health plan to expedite this request. Please note that [facility] is experiencing  a high volume of delays. Thank you, Deannie Gilford Specialist    [facility] Care.

## 2021-10-10 RX ORDER — AMLODIPINE BESYLATE 10 MG/1
TABLET ORAL
Qty: 90 TABLET | Refills: 0 | Status: SHIPPED | OUTPATIENT
Start: 2021-10-10 | End: 2022-01-19

## 2021-10-11 RX ORDER — AMLODIPINE BESYLATE 10 MG/1
TABLET ORAL
Qty: 90 TABLET | Refills: 0 | OUTPATIENT
Start: 2021-10-11

## 2021-10-19 ENCOUNTER — MED REC SCAN ONLY (OUTPATIENT)
Dept: INTERNAL MEDICINE CLINIC | Facility: CLINIC | Age: 66
End: 2021-10-19

## 2021-10-22 RX ORDER — SIMVASTATIN 20 MG
20 TABLET ORAL NIGHTLY
Qty: 90 TABLET | Refills: 1 | Status: SHIPPED | OUTPATIENT
Start: 2021-10-22 | End: 2022-10-19

## 2021-10-22 NOTE — TELEPHONE ENCOUNTER
Refill passed per [facility] protocol.   Requested Prescriptions   Pending Prescriptions Disp Refills    SIMVASTATIN 20 MG Oral Tab [Pharmacy Med Name: SIMVASTATIN 20MG TABLETS] 90 tablet 1     Sig: TAKE 1 TABLET(20 MG) BY MOUTH EVERY NIGHT        Cholesterol Medication Protocol Passed - 10/22/2021  5:28 AM        Passed - ALT in past 12 months        Passed - LDL in past 12 months        Passed - Last ALT < 80       Lab Results   Component Value Date    ALT 31 07/29/2021             Passed - Last LDL < 130     Lab Results   Component Value Date    LDL 82 07/29/2021               Passed - Appointment in past 12 or next 3 months             Recent Outpatient Visits              2 months ago Essential hypertension    [facility], [address], [place]    Nurse Only    2 months ago Adult general medical exam    Sandra Carlos MD    Office Visit    1 year ago Physical exam, annual    Paloma Carlos Almetta Squibb, MD    Office Visit    2 years ago Physical exam, annual    Paloma Carlos Almetta Squibb, MD    Office Visit    2 years ago History of colon polyps    [place] Iran Reader, MD    Office Visit

## 2021-10-29 ENCOUNTER — ANTI-COAG VISIT (OUTPATIENT)
Dept: INTERNAL MEDICINE CLINIC | Facility: CLINIC | Age: 66
End: 2021-10-29

## 2021-10-29 DIAGNOSIS — D68.59 HYPERCOAGULOPATHY (HCC): ICD-10-CM

## 2021-10-29 DIAGNOSIS — Z79.01 LONG TERM (CURRENT) USE OF ANTICOAGULANTS: ICD-10-CM

## 2021-10-29 DIAGNOSIS — D68.9 BLOOD CLOTTING DISORDER (HCC): ICD-10-CM

## 2021-11-22 ENCOUNTER — ANTI-COAG VISIT (OUTPATIENT)
Dept: INTERNAL MEDICINE CLINIC | Facility: CLINIC | Age: 66
End: 2021-11-22

## 2021-11-22 DIAGNOSIS — D68.9 BLOOD CLOTTING DISORDER (HCC): ICD-10-CM

## 2021-11-22 DIAGNOSIS — Z79.01 LONG TERM (CURRENT) USE OF ANTICOAGULANTS: ICD-10-CM

## 2021-11-22 DIAGNOSIS — D68.59 HYPERCOAGULOPATHY (HCC): ICD-10-CM

## 2021-11-22 PROCEDURE — 93793 ANTICOAG MGMT PT WARFARIN: CPT | Performed by: INTERNAL MEDICINE

## 2021-12-17 ENCOUNTER — TELEPHONE (OUTPATIENT)
Dept: INTERNAL MEDICINE CLINIC | Facility: CLINIC | Age: 66
End: 2021-12-17

## 2021-12-17 DIAGNOSIS — Z12.11 SCREEN FOR COLON CANCER: Primary | ICD-10-CM

## 2021-12-17 NOTE — TELEPHONE ENCOUNTER
Patient requesting a referral for GI for a Colonoscopy. Patient requesting referral to be mailed to his home address.

## 2021-12-21 NOTE — TELEPHONE ENCOUNTER
Please enter referral into Epic for Consult/office. Thank you, Bhavik Mccurdy Specialist    Managed Care.

## 2021-12-22 ENCOUNTER — TELEPHONE (OUTPATIENT)
Dept: INTERNAL MEDICINE CLINIC | Facility: CLINIC | Age: 66
End: 2021-12-22

## 2021-12-22 NOTE — TELEPHONE ENCOUNTER
Asked Xavi Joseph to return call to coumadin clinic. Number left to return call. EMILEE form mailed to his home.

## 2021-12-24 ENCOUNTER — ANTI-COAG VISIT (OUTPATIENT)
Dept: INTERNAL MEDICINE CLINIC | Facility: CLINIC | Age: 66
End: 2021-12-24

## 2021-12-24 DIAGNOSIS — D68.9 BLOOD CLOTTING DISORDER (HCC): ICD-10-CM

## 2021-12-24 DIAGNOSIS — D68.59 HYPERCOAGULOPATHY (HCC): ICD-10-CM

## 2021-12-24 DIAGNOSIS — Z79.01 LONG TERM (CURRENT) USE OF ANTICOAGULANTS: ICD-10-CM

## 2021-12-24 PROCEDURE — 93793 ANTICOAG MGMT PT WARFARIN: CPT | Performed by: INTERNAL MEDICINE

## 2021-12-29 ENCOUNTER — TELEPHONE (OUTPATIENT)
Dept: GASTROENTEROLOGY | Facility: CLINIC | Age: 66
End: 2021-12-29

## 2021-12-29 DIAGNOSIS — Z86.010 HX OF COLONIC POLYPS: Primary | ICD-10-CM

## 2021-12-29 RX ORDER — POLYETHYLENE GLYCOL 3350, SODIUM CHLORIDE, SODIUM BICARBONATE, POTASSIUM CHLORIDE 420; 11.2; 5.72; 1.48 G/4L; G/4L; G/4L; G/4L
POWDER, FOR SOLUTION ORAL
Qty: 4000 ML | Refills: 0 | Status: SHIPPED | OUTPATIENT
Start: 2021-12-29

## 2021-12-29 NOTE — TELEPHONE ENCOUNTER
The patient's chart has been reviewed. Okay to schedule pt for 3 year CLN recall r/t hx colon polyps with Dr. Rhiannon Ruth.      Advise MAC sedation with split dose Colyte/TriLyte or equivalent(eRx) preparation.   -Eligible for NE: No r/t medical hx  -Denies histo

## 2021-12-29 NOTE — TELEPHONE ENCOUNTER
[facility] HOSP - [facility] Endoscopy Report        Preoperative Diagnosis:  - history of colon polyps        Postoperative Diagnosis:  - colon polyps x 3  - diverticulosis  - internal hemorrhoids        Procedure:    Colonoscopy         Surgeon:  Daniel Luna JORDAN Waller MD   4/22/2019  5:02 PM CDT         RN to place on the colon call back for 3 years and mail letter to the pt.  Thanks.               Contains abnormal data Specimen to Pathology, Tissue: [id number]  Order: [id number]   Collected 4/19/2 ranging from 0.3 - 0.5 cm in greatest dimension and measuring 0.5 x 0.3 x 0.1 cm in aggregate.  The specimen is entirely submitted in cassette B1.     Specimen C is submitted in formalin labeled “Amanda, rectum polyp” and consists of three cm pink-tan, soft

## 2021-12-29 NOTE — TELEPHONE ENCOUNTER
Nahun Brock    Patient called to schedule 3 year recall colonoscopy. Please provide orders if appropriate.     Thank you      Last Procedure, Date, MD:  Colonoscopy Dr. Hillary Ding 4/19/2019  Last Diagnosis:  Colon polyps x3, diverticulosis, internal hemorrhoids    R

## 2022-01-11 NOTE — TELEPHONE ENCOUNTER
Hold Coumadin 5 days before the procedure. Check INR 3 days before the procedure. If INR is below 1.5, start injecting Lovenox 1 mg/kg SQ every 12 hours. Hold the Lovenox the night of the procedure and the morning of the procedure.   Resume Lovenox the nex

## 2022-01-11 NOTE — TELEPHONE ENCOUNTER
Patient is scheduled for a colonoscopy on 05/26/2022 with Dr. Alejandro Carmen. Please advise on Warfarin orders. Thank you.

## 2022-01-11 NOTE — TELEPHONE ENCOUNTER
I spoke with the patient and he is now schdueled.      Scheduled for:  Colonoscopy [address]  Provider Name:  Dr Hortencia Stokes  Date:  Thursday, 05/26/2022  Location:  [facility]  Sedation:  MAC  Time:  8:15am ( pt is aware arrivsl time is at 7:15am)  Prep:  Trilyte   Meds/Al

## 2022-01-12 NOTE — TELEPHONE ENCOUNTER
Called pt and he states he did not have to use lovenox with last procedure and doesn't think it is necessary. Pt is asking to speak with Dr Zane Frey directly.     If before 4:30 can contact pt at work [phone]    If after 4:30 pt can be contacted on c

## 2022-01-12 NOTE — TELEPHONE ENCOUNTER
RN Triage    Gi Clinical Staff are unable to review Lovenox bridging instructions.     Please assist.    Thank you

## 2022-01-12 NOTE — TELEPHONE ENCOUNTER
If he does not want to be bridged with Lovenox, I am okay with that as well. There is only slight increase risk of venous thromboembolism when we do not do bridging. If he has done good previously without bridging, I am fine with that.   Please inform him

## 2022-01-13 NOTE — TELEPHONE ENCOUNTER
Spoke with the patient,verified full name and , informed him of message below, stated has not had any trouble in the past.    Patient verbalized understanding no further questions.

## 2022-01-13 NOTE — TELEPHONE ENCOUNTER
Noted.  Thank you. Encounter flagged for closer to procedure date so pre procedure PT/INR order can be placed for stat PT/INR on arrival to endoscopy.

## 2022-01-16 LAB — INR: 2.5 (ref 2–3)

## 2022-01-17 ENCOUNTER — ANTI-COAG VISIT (OUTPATIENT)
Dept: INTERNAL MEDICINE CLINIC | Facility: CLINIC | Age: 67
End: 2022-01-17

## 2022-01-17 DIAGNOSIS — D68.9 BLOOD CLOTTING DISORDER (HCC): ICD-10-CM

## 2022-01-17 DIAGNOSIS — D68.59 HYPERCOAGULOPATHY (HCC): ICD-10-CM

## 2022-01-17 DIAGNOSIS — Z79.01 LONG TERM (CURRENT) USE OF ANTICOAGULANTS: ICD-10-CM

## 2022-01-17 PROCEDURE — 93793 ANTICOAG MGMT PT WARFARIN: CPT | Performed by: INTERNAL MEDICINE

## 2022-01-19 RX ORDER — AMLODIPINE BESYLATE 10 MG/1
10 TABLET ORAL DAILY
Qty: 90 TABLET | Refills: 1 | Status: SHIPPED | OUTPATIENT
Start: 2022-01-19 | End: 2022-08-04

## 2022-01-19 NOTE — TELEPHONE ENCOUNTER
Refill passed per [employer] protocol.      Requested Prescriptions   Pending Prescriptions Disp Refills    AMLODIPINE 10 MG Oral Tab [Pharmacy Med Name: AMLODIPINE BESYLATE 10MG TABLETS] 90 tablet 0     Sig: TAKE 1 TABLET(10 MG) BY MOUTH DAILY        Hypertensive Medications Protocol Passed - 1/18/2022  7:29 PM        Passed - CMP or BMP in past 12 months        Passed - Appointment in past 6 or next 3 months        Passed - GFR Non- > 50     Lab Results   Component Value Date    [place] [address] 07/29/2021                        Future Appointments         Provider Department Appt Notes    In 4 months [facility] PROCEDURE [address] GI PROCEDURE Colon w/ MAC @ [address] [place] [address]              Recent Outpatient Visits              5 months ago Essential hypertension    [place] [facility] [employer] [facility] [place], [facility], [address] [facility] [address], [place]    Nurse Only    5 months ago Adult general medical exam    [address] [facility], Genny Delatorre MD    Office Visit    1 year ago [facility] exam, annual    [facility]    Office Visit    2 years ago Physical exam, annual    [facility]    Office Visit    2 years ago History of colon polyps    Kee Shah Kathlee Crome, MD    Office Visit

## 2022-01-20 RX ORDER — AMLODIPINE BESYLATE 10 MG/1
TABLET ORAL
Qty: 90 TABLET | Refills: 1 | OUTPATIENT
Start: 2022-01-20

## 2022-02-23 ENCOUNTER — ANTI-COAG VISIT (OUTPATIENT)
Dept: INTERNAL MEDICINE CLINIC | Facility: CLINIC | Age: 67
End: 2022-02-23

## 2022-02-23 LAB — INR: 2.7 (ref 0.8–1.2)

## 2022-02-23 PROCEDURE — 93793 ANTICOAG MGMT PT WARFARIN: CPT | Performed by: INTERNAL MEDICINE

## 2022-03-14 LAB — INR: 2.2 (ref 2–3)

## 2022-03-17 ENCOUNTER — ANTI-COAG VISIT (OUTPATIENT)
Dept: INTERNAL MEDICINE CLINIC | Facility: CLINIC | Age: 67
End: 2022-03-17

## 2022-03-17 PROCEDURE — 93793 ANTICOAG MGMT PT WARFARIN: CPT | Performed by: INTERNAL MEDICINE

## 2022-03-22 ENCOUNTER — TELEPHONE (OUTPATIENT)
Dept: INTERNAL MEDICINE CLINIC | Facility: CLINIC | Age: 67
End: 2022-03-22

## 2022-03-22 NOTE — TELEPHONE ENCOUNTER
Debbie from [employer] is requesting a follow up to paperwork faxed directly to the office on 3/18/22 on patient. She can be reached back at [phone] ext [phone]. Please advise.

## 2022-03-22 NOTE — TELEPHONE ENCOUNTER
Called Debbie from [facility]-Notified we have not received any forms from 3/18. Provided [facility] Fax#

## 2022-03-28 NOTE — TELEPHONE ENCOUNTER
Received call from Dilip Wang at [facility] requesting status on paperwork re-faxed directly to the office on 3/24/22 on patient. She can be reached back at [phone] ext [phone]. Had Dilip Wang fax again to [facility] fax number [phone]. Please advise.

## 2022-04-01 ENCOUNTER — MED REC SCAN ONLY (OUTPATIENT)
Dept: INTERNAL MEDICINE CLINIC | Facility: CLINIC | Age: 67
End: 2022-04-01

## 2022-04-01 NOTE — TELEPHONE ENCOUNTER
Called [facility] and spoke to LUIS and informed that referral was generated and will take couple days to get authorized   LUIS verbalized understanding

## 2022-04-01 NOTE — TELEPHONE ENCOUNTER
Called patient and left a voicemail informing to call the office back.  When patient returns call please inform that referral to [facility] has been generated, per PCP will need to f/u with coumadin clinic for INR

## 2022-04-04 NOTE — TELEPHONE ENCOUNTER
Good AFternoon Dr Brigido Livingston and staff,    Please email me copy of paperwork received from [facility] so I can submit to [facility] for review in approving this referral for DME supplies. Thank you for your help,    Via Sally De La Torre. [email or web address]

## 2022-04-05 ENCOUNTER — MED REC SCAN ONLY (OUTPATIENT)
Dept: INTERNAL MEDICINE CLINIC | Facility: CLINIC | Age: 67
End: 2022-04-05

## 2022-04-18 ENCOUNTER — ANTI-COAG VISIT (OUTPATIENT)
Dept: INTERNAL MEDICINE CLINIC | Facility: CLINIC | Age: 67
End: 2022-04-18

## 2022-04-18 LAB
INR: 2.1 (ref 0.8–1.2)
INR: 2.1 (ref 0.8–1.2)

## 2022-04-18 PROCEDURE — 93793 ANTICOAG MGMT PT WARFARIN: CPT | Performed by: INTERNAL MEDICINE

## 2022-04-20 RX ORDER — WARFARIN SODIUM 5 MG/1
TABLET ORAL
Qty: 90 TABLET | Refills: 1 | Status: SHIPPED | OUTPATIENT
Start: 2022-04-20

## 2022-04-20 NOTE — TELEPHONE ENCOUNTER
Please review.  Protocol Failed or has no Protocol  Requested Prescriptions   Pending Prescriptions Disp Refills    WARFARIN 5 MG Oral Tab [Pharmacy Med Name: WARFARIN SOD 5MG TABLETS (PEACH)] 90 tablet 1     Sig: TAKE 1 TABLET(5 MG) BY MOUTH EVERY NIGHT        There is no refill protocol information for this order         Future Appointments         Provider Department Appt Notes    In 1 month [facility], PROCEDURE [address] GI PROCEDURE Colon w/ MAC @ [address] [place] [address]     In 3 months Juan Velasquez MD [facility], [address], [place] px \"policy informed\"          Recent Outpatient Visits              8 months ago Essential hypertension    [facility], [address], [place]    Nurse Only    8 months ago Adult general medical exam    Sandra Mendez MD    Office Visit    1 year ago Physical exam, annual    Paloma Mendez Elray Ireland, MD    Office Visit    2 years ago Physical exam, annual    Paloma Mendez Elray Ireland, MD    Office Visit    3 years ago History of colon polyps    Kee Fuller Sal Leep, MD    Office Visit

## 2022-05-16 NOTE — TELEPHONE ENCOUNTER
Approved.   Thank you [employer]: The suture ends were repeatedly re-tightened and re-clamped to achieve the desired tissue expansion.

## 2022-05-21 LAB — INR: 2.6 (ref 2–3)

## 2022-05-23 ENCOUNTER — ANTI-COAG VISIT (OUTPATIENT)
Dept: ANTICOAGULATION | Facility: CLINIC | Age: 67
End: 2022-05-23

## 2022-05-23 DIAGNOSIS — Z79.01 LONG TERM (CURRENT) USE OF ANTICOAGULANTS: ICD-10-CM

## 2022-05-23 DIAGNOSIS — D68.9 BLOOD CLOTTING DISORDER (HCC): ICD-10-CM

## 2022-05-23 DIAGNOSIS — D68.59 HYPERCOAGULOPATHY (HCC): ICD-10-CM

## 2022-05-26 ENCOUNTER — HOSPITAL ENCOUNTER (OUTPATIENT)
Facility: HOSPITAL | Age: 67
Setting detail: HOSPITAL OUTPATIENT SURGERY
Discharge: HOME | End: 2022-05-26
Attending: INTERNAL MEDICINE | Admitting: INTERNAL MEDICINE
Payer: COMMERCIAL

## 2022-05-26 ENCOUNTER — ANESTHESIA (OUTPATIENT)
Dept: ENDOSCOPY | Facility: HOSPITAL | Age: 67
End: 2022-05-26
Payer: COMMERCIAL

## 2022-05-26 ENCOUNTER — ANESTHESIA EVENT (OUTPATIENT)
Dept: ENDOSCOPY | Facility: HOSPITAL | Age: 67
End: 2022-05-26
Payer: COMMERCIAL

## 2022-05-26 VITALS
DIASTOLIC BLOOD PRESSURE: 56 MMHG | OXYGEN SATURATION: 95 % | TEMPERATURE: 98 F | HEART RATE: 56 BPM | RESPIRATION RATE: 20 BRPM | HEIGHT: 71 IN | BODY MASS INDEX: 30.24 KG/M2 | WEIGHT: 216 LBS | SYSTOLIC BLOOD PRESSURE: 96 MMHG

## 2022-05-26 DIAGNOSIS — Z86.010 HX OF COLONIC POLYPS: ICD-10-CM

## 2022-05-26 LAB
INR BLD: 0.97 (ref 0.8–1.2)
PROTHROMBIN TIME: 13 SECONDS (ref 11.6–14.8)

## 2022-05-26 PROCEDURE — 0DBL8ZX EXCISION OF TRANSVERSE COLON, VIA NATURAL OR ARTIFICIAL OPENING ENDOSCOPIC, DIAGNOSTIC: ICD-10-PCS | Performed by: INTERNAL MEDICINE

## 2022-05-26 PROCEDURE — 0DBN8ZX EXCISION OF SIGMOID COLON, VIA NATURAL OR ARTIFICIAL OPENING ENDOSCOPIC, DIAGNOSTIC: ICD-10-PCS | Performed by: INTERNAL MEDICINE

## 2022-05-26 PROCEDURE — 0DBH8ZX EXCISION OF CECUM, VIA NATURAL OR ARTIFICIAL OPENING ENDOSCOPIC, DIAGNOSTIC: ICD-10-PCS | Performed by: INTERNAL MEDICINE

## 2022-05-26 PROCEDURE — 45380 COLONOSCOPY AND BIOPSY: CPT | Performed by: INTERNAL MEDICINE

## 2022-05-26 RX ORDER — LIDOCAINE HYDROCHLORIDE 10 MG/ML
INJECTION, SOLUTION EPIDURAL; INFILTRATION; INTRACAUDAL; PERINEURAL AS NEEDED
Status: DISCONTINUED | OUTPATIENT
Start: 2022-05-26 | End: 2022-05-26 | Stop reason: SURG

## 2022-05-26 RX ORDER — SODIUM CHLORIDE, SODIUM LACTATE, POTASSIUM CHLORIDE, CALCIUM CHLORIDE 600; 310; 30; 20 MG/100ML; MG/100ML; MG/100ML; MG/100ML
INJECTION, SOLUTION INTRAVENOUS CONTINUOUS
Status: DISCONTINUED | OUTPATIENT
Start: 2022-05-26 | End: 2022-05-26

## 2022-05-26 RX ADMIN — LIDOCAINE HYDROCHLORIDE 50 MG: 10 INJECTION, SOLUTION EPIDURAL; INFILTRATION; INTRACAUDAL; PERINEURAL at 08:31:00

## 2022-05-26 RX ADMIN — SODIUM CHLORIDE, SODIUM LACTATE, POTASSIUM CHLORIDE, CALCIUM CHLORIDE: 600; 310; 30; 20 INJECTION, SOLUTION INTRAVENOUS at 08:29:00

## 2022-05-26 RX ADMIN — SODIUM CHLORIDE, SODIUM LACTATE, POTASSIUM CHLORIDE, CALCIUM CHLORIDE: 600; 310; 30; 20 INJECTION, SOLUTION INTRAVENOUS at 09:07:00

## 2022-05-26 NOTE — ANESTHESIA POSTPROCEDURE EVALUATION
Patient: Sandrita Russell    Procedure Summary     Date: 05/26/22 Room / Location: [address] [place] [address] ENDOSCOPY 04 / [facility] ENDOSCOPY    Anesthesia Start: 0856 Anesthesia Stop: 4928    Procedure: COLONOSCOPY (N/A ) Diagnosis:       Hx of colonic polyps      (polyps; hemorrhoids)    Surgeons: Jony Aldana MD Anesthesiologist: Abdiel Cruz CRNA    Anesthesia Type: MAC ASA Status: 2          Anesthesia Type: No value filed.     Vitals Value Taken Time   /67 05/26/22 0906   Temp  05/26/22 0906   Pulse 76 05/26/22 0906   Resp 16 05/26/22 0906   SpO2 99 05/26/22 0906       [facility] AN Post Evaluation:   Patient Evaluated in PACU  Patient Participation: complete - patient participated  Level of Consciousness: awake and alert  Pain Management: adequate  Airway Patency:patent  Yes    Cardiovascular Status: acceptable  Respiratory Status: acceptable  Postoperative Hydration acceptable      Luh Mei CRNA  5/26/2022 9:06 AM

## 2022-05-26 NOTE — OPERATIVE REPORT
[facility] Endoscopy Report      Preoperative Diagnosis:  - colon polyp history       Postoperative Diagnosis:  - colon polyps x 3  - diverticulosis   - internal hemorrhoids      Procedure:    Colonoscopy       Surgeon:  Jacob Cruz M.D. Anesthesia:  MAC     Technique:  After informed consent, the patient was placed in the left lateral recumbent position. Digital rectal examination revealed no palpable intraluminal abnormalities. An Olympus variable stiffness 190 series HD colonoscope was inserted into the rectum and advanced under direct vision by following the lumen to the cecum. The colon was examined upon withdrawal in the left lateral position. The procedure was well tolerated without immediate complication. Findings:  The preparation of the colon was good. The terminal ileum was examined for 4 cm and visually normal.  The ileocecal valve was well preserved. The visualized colonic mucosa from the cecum to the anal verge was normal with an intact vascular pattern. Colon polyps x3 removed as follows;  -Cecum x1, diminutive removed by cold forceps technique. -Transverse x1, diminutive removed by cold forceps technique.  -Sigmoid x1, diminutive removed by cold forceps technique. All polypectomy sites inspected and found to be free of bleeding and specimens retrieved and sent for analysis. Diverticular disease with scattered rare diverticula noted in the mid colon, transverse region, no evidence of diverticulitis. Small internal hemorrhoids noted on retroflexed view. Estimated blood loss-insignificant  Specimens-colon polyps    Impression:  - colon polyps x 3  - diverticulosis   - internal hemorrhoids    Recommendations:  - Post polypectomy instructions given  - Repeat colonoscopy in 3- 5 years  - High fiber diet for diverticular disease  - Symptomatic treatment of hemorrhoids          Ilda Lorenz MD  5/26/2022  9:01 AM

## 2022-05-27 ENCOUNTER — TELEPHONE (OUTPATIENT)
Dept: GASTROENTEROLOGY | Facility: CLINIC | Age: 67
End: 2022-05-27

## 2022-05-27 NOTE — TELEPHONE ENCOUNTER
Left message for patient to call back. Health Maintenance Updated. 3 year colonoscopy recall entered into patient outreach in [facility]. Next colonoscopy will be due 5/26/2025.

## 2022-05-27 NOTE — TELEPHONE ENCOUNTER
Call transferred from phone room. Spoke to patient and verified . I reviewed result note below, patient verbalized understanding. No additional questions.

## 2022-05-27 NOTE — TELEPHONE ENCOUNTER
----- Message from Camila Grimes MD sent at 5/26/2022  6:26 PM CDT -----  I wanted to get back to you with your colonoscopy results. You had 3 colon polyps removed which were benign. I would advise a repeat colonoscopy in 3 years to make sure no new polyps are forming. You also have internal hemorrhoids and diverticulosis. Please stay on a high fiber diet and call with any questions.

## 2022-06-19 LAB — INR: 2.3 (ref 2–3)

## 2022-06-20 ENCOUNTER — ANTI-COAG VISIT (OUTPATIENT)
Dept: ANTICOAGULATION | Facility: CLINIC | Age: 67
End: 2022-06-20

## 2022-06-20 ENCOUNTER — TELEPHONE (OUTPATIENT)
Dept: ANTICOAGULATION | Facility: CLINIC | Age: 67
End: 2022-06-20

## 2022-06-20 DIAGNOSIS — D68.59 HYPERCOAGULOPATHY (HCC): ICD-10-CM

## 2022-06-20 DIAGNOSIS — D68.9 BLOOD CLOTTING DISORDER (HCC): ICD-10-CM

## 2022-06-20 DIAGNOSIS — Z79.01 LONG TERM (CURRENT) USE OF ANTICOAGULANTS: ICD-10-CM

## 2022-06-20 DIAGNOSIS — Z51.81 ENCOUNTER FOR THERAPEUTIC DRUG MONITORING: ICD-10-CM

## 2022-06-20 DIAGNOSIS — Z79.01 LONG TERM (CURRENT) USE OF ANTICOAGULANTS: Primary | ICD-10-CM

## 2022-06-20 NOTE — TELEPHONE ENCOUNTER
The Anticoagulation Referral has . A pended referral has been placed. Please review and sign.          Patient Information:  Patient Name:  Coby Johnson, ([id number]) Sex: male : 1955   ________________________________________________________________  Referral Information:  Order Date: 2021  [address] Order #: [id number]   Referral Type: AMB REFERRAL TO ANTICOAGULATION MONITORING Dx: Long term (current) use of anticoagulants (Z79.01)  Hypercoagulopathy ([facility] (D68.59)  Blood clotting disorder ([facility] (D68.9)   Responsible Clinic: [address] [place] [address]

## 2022-07-26 ENCOUNTER — ANTI-COAG VISIT (OUTPATIENT)
Dept: ANTICOAGULATION | Facility: CLINIC | Age: 67
End: 2022-07-26

## 2022-07-26 DIAGNOSIS — D68.59 HYPERCOAGULOPATHY (HCC): ICD-10-CM

## 2022-07-26 DIAGNOSIS — D68.9 BLOOD CLOTTING DISORDER (HCC): ICD-10-CM

## 2022-07-26 LAB — INR: 2.1 (ref 0.8–1.2)

## 2022-07-26 PROCEDURE — 93793 ANTICOAG MGMT PT WARFARIN: CPT | Performed by: INTERNAL MEDICINE

## 2022-07-27 RX ORDER — WARFARIN SODIUM 5 MG/1
TABLET ORAL
Qty: 90 TABLET | Refills: 1 | Status: SHIPPED | OUTPATIENT
Start: 2022-07-27

## 2022-08-04 ENCOUNTER — OFFICE VISIT (OUTPATIENT)
Facility: CLINIC | Age: 67
End: 2022-08-04
Payer: COMMERCIAL

## 2022-08-04 ENCOUNTER — LAB ENCOUNTER (OUTPATIENT)
Dept: LAB | Facility: HOSPITAL | Age: 67
End: 2022-08-04
Attending: INTERNAL MEDICINE
Payer: COMMERCIAL

## 2022-08-04 VITALS
HEIGHT: 71 IN | RESPIRATION RATE: 14 BRPM | DIASTOLIC BLOOD PRESSURE: 80 MMHG | BODY MASS INDEX: 27.44 KG/M2 | SYSTOLIC BLOOD PRESSURE: 120 MMHG | HEART RATE: 64 BPM | WEIGHT: 196 LBS | OXYGEN SATURATION: 96 %

## 2022-08-04 DIAGNOSIS — Z00.00 ADULT GENERAL MEDICAL EXAM: Primary | ICD-10-CM

## 2022-08-04 DIAGNOSIS — Z23 NEED FOR PNEUMOCOCCAL VACCINATION: ICD-10-CM

## 2022-08-04 DIAGNOSIS — I87.001 POST-THROMBOTIC SYNDROME OF RIGHT LOWER EXTREMITY: ICD-10-CM

## 2022-08-04 DIAGNOSIS — Z00.00 ADULT GENERAL MEDICAL EXAM: ICD-10-CM

## 2022-08-04 DIAGNOSIS — R21 RASH AND NONSPECIFIC SKIN ERUPTION: ICD-10-CM

## 2022-08-04 LAB
ALBUMIN SERPL-MCNC: 3.4 G/DL (ref 3.4–5)
ALBUMIN/GLOB SERPL: 1.1 {RATIO} (ref 1–2)
ALP LIVER SERPL-CCNC: 60 U/L
ALT SERPL-CCNC: 21 U/L
ANION GAP SERPL CALC-SCNC: 3 MMOL/L (ref 0–18)
AST SERPL-CCNC: 18 U/L (ref 15–37)
BILIRUB SERPL-MCNC: 0.6 MG/DL (ref 0.1–2)
BUN BLD-MCNC: 11 MG/DL (ref 7–18)
BUN/CREAT SERPL: 10.9 (ref 10–20)
CALCIUM BLD-MCNC: 8.8 MG/DL (ref 8.5–10.1)
CHLORIDE SERPL-SCNC: 109 MMOL/L (ref 98–112)
CHOLEST SERPL-MCNC: 137 MG/DL (ref ?–200)
CO2 SERPL-SCNC: 28 MMOL/L (ref 21–32)
COMPLEXED PSA SERPL-MCNC: 1.65 NG/ML (ref ?–4)
CREAT BLD-MCNC: 1.01 MG/DL
DEPRECATED RDW RBC AUTO: 47.7 FL (ref 35.1–46.3)
ERYTHROCYTE [DISTWIDTH] IN BLOOD BY AUTOMATED COUNT: 12.8 % (ref 11–15)
EST. AVERAGE GLUCOSE BLD GHB EST-MCNC: 111 MG/DL (ref 68–126)
FASTING PATIENT LIPID ANSWER: YES
FASTING STATUS PATIENT QL REPORTED: YES
GFR SERPLBLD BASED ON 1.73 SQ M-ARVRAT: 81 ML/MIN/1.73M2 (ref 60–?)
GLOBULIN PLAS-MCNC: 3.2 G/DL (ref 2.8–4.4)
GLUCOSE BLD-MCNC: 99 MG/DL (ref 70–99)
HBA1C MFR BLD: 5.5 % (ref ?–5.7)
HCT VFR BLD AUTO: 42 %
HDLC SERPL-MCNC: 46 MG/DL (ref 40–59)
HGB BLD-MCNC: 13.8 G/DL
LDLC SERPL CALC-MCNC: 71 MG/DL (ref ?–100)
MCH RBC QN AUTO: 32.9 PG (ref 26–34)
MCHC RBC AUTO-ENTMCNC: 32.9 G/DL (ref 31–37)
MCV RBC AUTO: 100.2 FL
NONHDLC SERPL-MCNC: 91 MG/DL (ref ?–130)
OSMOLALITY SERPL CALC.SUM OF ELEC: 289 MOSM/KG (ref 275–295)
PLATELET # BLD AUTO: 176 10(3)UL (ref 150–450)
POTASSIUM SERPL-SCNC: 4.7 MMOL/L (ref 3.5–5.1)
PROT SERPL-MCNC: 6.6 G/DL (ref 6.4–8.2)
RBC # BLD AUTO: 4.19 X10(6)UL
SODIUM SERPL-SCNC: 140 MMOL/L (ref 136–145)
TRIGL SERPL-MCNC: 107 MG/DL (ref 30–149)
TSI SER-ACNC: 1.28 MIU/ML (ref 0.36–3.74)
VLDLC SERPL CALC-MCNC: 16 MG/DL (ref 0–30)
WBC # BLD AUTO: 4.6 X10(3) UL (ref 4–11)

## 2022-08-04 PROCEDURE — 80061 LIPID PANEL: CPT

## 2022-08-04 PROCEDURE — 90677 PCV20 VACCINE IM: CPT | Performed by: INTERNAL MEDICINE

## 2022-08-04 PROCEDURE — 3079F DIAST BP 80-89 MM HG: CPT | Performed by: INTERNAL MEDICINE

## 2022-08-04 PROCEDURE — 83036 HEMOGLOBIN GLYCOSYLATED A1C: CPT

## 2022-08-04 PROCEDURE — 84443 ASSAY THYROID STIM HORMONE: CPT

## 2022-08-04 PROCEDURE — 85027 COMPLETE CBC AUTOMATED: CPT

## 2022-08-04 PROCEDURE — 36415 COLL VENOUS BLD VENIPUNCTURE: CPT

## 2022-08-04 PROCEDURE — 3008F BODY MASS INDEX DOCD: CPT | Performed by: INTERNAL MEDICINE

## 2022-08-04 PROCEDURE — 80053 COMPREHEN METABOLIC PANEL: CPT

## 2022-08-04 PROCEDURE — 99397 PER PM REEVAL EST PAT 65+ YR: CPT | Performed by: INTERNAL MEDICINE

## 2022-08-04 PROCEDURE — 3074F SYST BP LT 130 MM HG: CPT | Performed by: INTERNAL MEDICINE

## 2022-08-04 PROCEDURE — 90471 IMMUNIZATION ADMIN: CPT | Performed by: INTERNAL MEDICINE

## 2022-08-04 RX ORDER — AMLODIPINE BESYLATE 10 MG/1
10 TABLET ORAL DAILY
Qty: 90 TABLET | Refills: 1 | Status: SHIPPED | OUTPATIENT
Start: 2022-08-04

## 2022-08-22 ENCOUNTER — APPOINTMENT (OUTPATIENT)
Dept: URBAN - METROPOLITAN AREA CLINIC 244 | Age: 67
Setting detail: DERMATOLOGY
End: 2022-08-23

## 2022-08-22 ENCOUNTER — ANTI-COAG VISIT (OUTPATIENT)
Dept: ANTICOAGULATION | Facility: CLINIC | Age: 67
End: 2022-08-22

## 2022-08-22 DIAGNOSIS — L82.0 INFLAMED SEBORRHEIC KERATOSIS: ICD-10-CM

## 2022-08-22 DIAGNOSIS — D22 MELANOCYTIC NEVI: ICD-10-CM

## 2022-08-22 DIAGNOSIS — D68.9 BLOOD CLOTTING DISORDER (HCC): ICD-10-CM

## 2022-08-22 DIAGNOSIS — Z79.01 LONG TERM (CURRENT) USE OF ANTICOAGULANTS: ICD-10-CM

## 2022-08-22 DIAGNOSIS — L81.4 OTHER MELANIN HYPERPIGMENTATION: ICD-10-CM

## 2022-08-22 DIAGNOSIS — L82.1 OTHER SEBORRHEIC KERATOSIS: ICD-10-CM

## 2022-08-22 DIAGNOSIS — D68.59 HYPERCOAGULOPATHY (HCC): ICD-10-CM

## 2022-08-22 PROBLEM — D48.5 NEOPLASM OF UNCERTAIN BEHAVIOR OF SKIN: Status: ACTIVE | Noted: 2022-08-22

## 2022-08-22 PROBLEM — D22.5 MELANOCYTIC NEVI OF TRUNK: Status: ACTIVE | Noted: 2022-08-22

## 2022-08-22 LAB — INR: 2.5 (ref 0.8–1.2)

## 2022-08-22 PROCEDURE — 17110 DESTRUCT B9 LESION 1-14: CPT

## 2022-08-22 PROCEDURE — OTHER BIOPSY BY SHAVE METHOD: OTHER

## 2022-08-22 PROCEDURE — OTHER ADDITIONAL NOTES: OTHER

## 2022-08-22 PROCEDURE — 11102 TANGNTL BX SKIN SINGLE LES: CPT | Mod: 59

## 2022-08-22 PROCEDURE — 99203 OFFICE O/P NEW LOW 30 MIN: CPT | Mod: 25

## 2022-08-22 PROCEDURE — OTHER COUNSELING: OTHER

## 2022-08-22 PROCEDURE — OTHER LIQUID NITROGEN: OTHER

## 2022-08-22 ASSESSMENT — LOCATION DETAILED DESCRIPTION DERM
LOCATION DETAILED: UPPER STERNUM
LOCATION DETAILED: LEFT MEDIAL UPPER BACK
LOCATION DETAILED: RIGHT SUPERIOR MEDIAL UPPER BACK
LOCATION DETAILED: RIGHT LATERAL DISTAL DORSAL PENILE SHAFT

## 2022-08-22 ASSESSMENT — LOCATION ZONE DERM
LOCATION ZONE: PENIS
LOCATION ZONE: TRUNK

## 2022-08-22 ASSESSMENT — LOCATION SIMPLE DESCRIPTION DERM
LOCATION SIMPLE: LEFT UPPER BACK
LOCATION SIMPLE: CHEST
LOCATION SIMPLE: DORSAL PENIS
LOCATION SIMPLE: RIGHT UPPER BACK

## 2022-08-22 NOTE — PROCEDURE: LIQUID NITROGEN
Show Topical Anesthesia Variable?: Yes
Spray Paint Text: The liquid nitrogen was applied to the skin utilizing a spray paint frosting technique.
Number Of Freeze-Thaw Cycles: 2 freeze-thaw cycles
Duration Of Freeze Thaw-Cycle (Seconds): 5-10
Render Post-Care Instructions In Note?: no
Medical Necessity Clause: This procedure was medically necessary because the lesions that were treated were:
Consent: The patient's consent was obtained including but not limited to risks of crusting, scabbing, blistering, scarring, darker or lighter pigmentary change, recurrence, incomplete removal and infection.
Medical Necessity Information: It is in your best interest to select a reason for this procedure from the list below. All of these items fulfill various CMS LCD requirements except the new and changing color options.
Post-Care Instructions: I reviewed with the patient in detail post-care instructions. Patient is to wear sunprotection, and avoid picking at any of the treated lesions. Pt may apply Vaseline to crusted or scabbing areas.
Detail Level: Simple

## 2022-08-22 NOTE — PROCEDURE: BIOPSY BY SHAVE METHOD
Wound Care: Petrolatum
Type Of Destruction Used: Curettage
Information: Selecting Yes will display possible errors in your note based on the variables you have selected. This validation is only offered as a suggestion for you. PLEASE NOTE THAT THE VALIDATION TEXT WILL BE REMOVED WHEN YOU FINALIZE YOUR NOTE. IF YOU WANT TO FAX A PRELIMINARY NOTE YOU WILL NEED TO TOGGLE THIS TO 'NO' IF YOU DO NOT WANT IT IN YOUR FAXED NOTE.
Validate Anticipated Plan: No
Was A Bandage Applied: Yes
Size Of Lesion In Cm: 0
Billing Type: Third-Party Bill
Electrodesiccation And Curettage Text: The wound bed was treated with electrodesiccation and curettage after the biopsy was performed.
Biopsy Type: H and E
Curettage Text: The wound bed was treated with curettage after the biopsy was performed.
Hemostasis: Aluminum Chloride
Dressing: bandage
Silver Nitrate Text: The wound bed was treated with silver nitrate after the biopsy was performed.
Cryotherapy Text: The wound bed was treated with cryotherapy after the biopsy was performed.
Electrodesiccation Text: The wound bed was treated with electrodesiccation after the biopsy was performed.
Biopsy Method: Dermablade
Detail Level: Detailed
Anesthesia Type: 1% lidocaine with epinephrine and a 1:10 solution of 8.4% sodium bicarbonate
Consent: Written consent was obtained and risks were reviewed including but not limited to scarring, infection, bleeding, scabbing, incomplete removal, nerve damage and allergy to anesthesia.
Anesthesia Volume In Cc (Will Not Render If 0): 2
Notification Instructions: Patient will be notified of biopsy results. However, patient instructed to call the office if not contacted within 2 weeks.
Depth Of Biopsy: dermis
Post-Care Instructions: I reviewed with the patient in detail post-care instructions. Patient is to keep the biopsy site dry overnight, and then apply Petrolatum twice daily until healed, or after a night or two leave area open and dry overnight and a scab will form which will fall off on its own in a few weeks.

## 2022-08-22 NOTE — PROCEDURE: ADDITIONAL NOTES
Detail Level: Detailed
Additional Notes: Will cryo as ISK but will recheck in 1mo and if persists (or if recurs later) recc biopsy to r/o neoplasm
Render Risk Assessment In Note?: no

## 2022-08-24 ENCOUNTER — MED REC SCAN ONLY (OUTPATIENT)
Dept: INTERNAL MEDICINE CLINIC | Facility: CLINIC | Age: 67
End: 2022-08-24

## 2022-09-19 ENCOUNTER — ANTI-COAG VISIT (OUTPATIENT)
Dept: ANTICOAGULATION | Facility: CLINIC | Age: 67
End: 2022-09-19

## 2022-09-19 ENCOUNTER — APPOINTMENT (OUTPATIENT)
Dept: URBAN - METROPOLITAN AREA CLINIC 244 | Age: 67
Setting detail: DERMATOLOGY
End: 2022-09-20

## 2022-09-19 DIAGNOSIS — D68.9 BLOOD CLOTTING DISORDER (HCC): ICD-10-CM

## 2022-09-19 DIAGNOSIS — D68.59 HYPERCOAGULOPATHY (HCC): ICD-10-CM

## 2022-09-19 DIAGNOSIS — Z79.01 LONG TERM (CURRENT) USE OF ANTICOAGULANTS: ICD-10-CM

## 2022-09-19 DIAGNOSIS — L82.0 INFLAMED SEBORRHEIC KERATOSIS: ICD-10-CM

## 2022-09-19 PROBLEM — D48.5 NEOPLASM OF UNCERTAIN BEHAVIOR OF SKIN: Status: ACTIVE | Noted: 2022-09-19

## 2022-09-19 LAB — INR: 2 (ref 0.8–1.2)

## 2022-09-19 PROCEDURE — OTHER BIOPSY BY SHAVE METHOD: OTHER

## 2022-09-19 PROCEDURE — 54100 BIOPSY OF PENIS: CPT

## 2022-09-19 ASSESSMENT — LOCATION SIMPLE DESCRIPTION DERM: LOCATION SIMPLE: DORSAL PENIS

## 2022-09-19 ASSESSMENT — LOCATION ZONE DERM: LOCATION ZONE: PENIS

## 2022-09-19 ASSESSMENT — LOCATION DETAILED DESCRIPTION DERM: LOCATION DETAILED: RIGHT LATERAL MID-DORSAL PENILE SHAFT

## 2022-09-19 NOTE — PROCEDURE: BIOPSY BY SHAVE METHOD
Hemostasis: Drysol
Type Of Destruction Used: Curettage
Electrodesiccation And Curettage Text: The wound bed was treated with electrodesiccation and curettage after the biopsy was performed.
Was A Bandage Applied: Yes
X Size Of Lesion In Cm: 0
Dressing: bandage
Hide Second Anesthesia?: No
Electrodesiccation Text: The wound bed was treated with electrodesiccation after the biopsy was performed.
Depth Of Biopsy: dermis
Biopsy Method: Dermablade
Information: Selecting Yes will display possible errors in your note based on the variables you have selected. This validation is only offered as a suggestion for you. PLEASE NOTE THAT THE VALIDATION TEXT WILL BE REMOVED WHEN YOU FINALIZE YOUR NOTE. IF YOU WANT TO FAX A PRELIMINARY NOTE YOU WILL NEED TO TOGGLE THIS TO 'NO' IF YOU DO NOT WANT IT IN YOUR FAXED NOTE.
Curettage Text: The wound bed was treated with curettage after the biopsy was performed.
Billing Type: Third-Party Bill
Detail Level: Simple
Cryotherapy Text: The wound bed was treated with cryotherapy after the biopsy was performed.
Silver Nitrate Text: The wound bed was treated with silver nitrate after the biopsy was performed.
Anesthesia Volume In Cc (Will Not Render If 0): 0.5
Biopsy Type: H and E
Post-Care Instructions: I reviewed with the patient in detail post-care instructions. Patient is to keep the biopsy site dry overnight, and then apply Petrolatum twice daily until healed, or after a night or two leave area open and dry overnight and a scab will form which will fall off on its own in a few weeks.
Anesthesia Type: 0.5% lidocaine with 1:200,000 epinephrine and a 1:10 solution of 8.4% sodium bicarbonate
Wound Care: Petrolatum
Consent: Written consent was obtained and risks were reviewed including but not limited to scarring, infection, bleeding, scabbing, incomplete removal, nerve damage and allergy to anesthesia.
Notification Instructions: Patient will be notified of biopsy results. However, patient instructed to call the office if not contacted within 2 weeks.

## 2022-09-23 ENCOUNTER — MED REC SCAN ONLY (OUTPATIENT)
Dept: INTERNAL MEDICINE CLINIC | Facility: CLINIC | Age: 67
End: 2022-09-23

## 2022-10-10 ENCOUNTER — APPOINTMENT (OUTPATIENT)
Dept: URBAN - METROPOLITAN AREA CLINIC 244 | Age: 67
Setting detail: DERMATOLOGY
End: 2022-10-12

## 2022-10-10 PROBLEM — D07.4 CARCINOMA IN SITU OF PENIS: Status: ACTIVE | Noted: 2022-10-10

## 2022-10-10 PROCEDURE — OTHER PRESCRIPTION MEDICATION MANAGEMENT: OTHER

## 2022-10-10 PROCEDURE — OTHER PATHOLOGY DISCUSSION: OTHER

## 2022-10-10 PROCEDURE — OTHER PRESCRIPTION: OTHER

## 2022-10-10 PROCEDURE — OTHER COUNSELING: OTHER

## 2022-10-10 PROCEDURE — 99213 OFFICE O/P EST LOW 20 MIN: CPT

## 2022-10-10 RX ORDER — IMIQUIMOD 12.5 MG/.25G
CREAM TOPICAL
Qty: 24 | Refills: 6 | Status: ERX | COMMUNITY
Start: 2022-10-10

## 2022-10-12 ENCOUNTER — MED REC SCAN ONLY (OUTPATIENT)
Dept: INTERNAL MEDICINE CLINIC | Facility: CLINIC | Age: 67
End: 2022-10-12

## 2022-10-19 RX ORDER — SIMVASTATIN 20 MG
20 TABLET ORAL NIGHTLY
Qty: 90 TABLET | Refills: 1 | Status: SHIPPED | OUTPATIENT
Start: 2022-10-19

## 2022-10-19 NOTE — TELEPHONE ENCOUNTER
Refill passed per [facility] protocol. Requested Prescriptions   Pending Prescriptions Disp Refills    simvastatin 20 MG Oral Tab 90 tablet 1     Sig: Take 1 tablet (20 mg total) by mouth nightly.         Cholesterol Medication Protocol Passed - 10/19/2022 12:25 PM        Passed - ALT in past 12 months        Passed - LDL in past 12 months        Passed - Last ALT < 80       Lab Results   Component Value Date    ALT 21 08/04/2022             Passed - Last LDL < 130     Lab Results   Component Value Date    LDL 71 08/04/2022               Passed - In person appointment or virtual visit in the past 12 mos or appointment in next 3 mos       Recent Outpatient Visits              2 months ago Adult general medical exam    Floyd Corrigan MD    Office Visit    1 year ago Essential hypertension    [facility], [address], [place]    Nurse Only    1 year ago Adult general medical exam    [facility], [address], Floyd Escobar MD    Office Visit    2 years ago Physical exam, annual    [facility], [address], Joycelyn Pandey MD    Office Visit    3 years ago Physical exam, annual    [facility], [address], Joycelyn Pandey MD    Office Visit                      Recent Outpatient Visits              2 months ago Adult general medical exam    Floyd Corrigan MD    Office Visit    1 year ago Essential hypertension    [facility], [address], [place]    Nurse Only    1 year ago Adult general medical exam    Floyd Johansen MD    Office Visit    2 years ago Physical exam, annual    [facility], [address], Joycelyn Pandey MD    Office Visit    3 years ago Physical exam, annual    Paloma Johansen Barbara Gross, MD    Office Visit

## 2022-10-24 ENCOUNTER — ANTI-COAG VISIT (OUTPATIENT)
Dept: ANTICOAGULATION | Facility: CLINIC | Age: 67
End: 2022-10-24

## 2022-10-24 DIAGNOSIS — Z79.01 LONG TERM (CURRENT) USE OF ANTICOAGULANTS: ICD-10-CM

## 2022-10-24 DIAGNOSIS — D68.9 BLOOD CLOTTING DISORDER (HCC): ICD-10-CM

## 2022-10-24 DIAGNOSIS — D68.59 HYPERCOAGULOPATHY (HCC): ICD-10-CM

## 2022-10-24 LAB — INR: 2 (ref 0.8–1.2)

## 2022-10-24 PROCEDURE — 93793 ANTICOAG MGMT PT WARFARIN: CPT | Performed by: INTERNAL MEDICINE

## 2022-10-25 ENCOUNTER — TELEPHONE (OUTPATIENT)
Dept: INTERNAL MEDICINE CLINIC | Facility: CLINIC | Age: 67
End: 2022-10-25

## 2022-10-25 NOTE — TELEPHONE ENCOUNTER
[id number] - [address] AT [address] [facility] [address], [phone], [phone]     Associated Reports   View Encounter   Priority and Order Details      Disp Refills Start End    amLODIPine 10 MG Oral Tab 90 tablet 1 8/4/2022     Sig - Route:  Take 1 tablet (10 mg total) by mouth daily. - Oral    Sent to pharmacy as: amLODIPine Besylate 10 MG Oral Tablet (Norvas)    Notes to Pharmacy: Patient needs to call with BP readings    E-Prescribing Status: Receipt confirmed by pharmacy (8/4/2022 10:13 AM CDT)

## 2022-11-14 ENCOUNTER — APPOINTMENT (OUTPATIENT)
Dept: URBAN - METROPOLITAN AREA CLINIC 244 | Age: 67
Setting detail: DERMATOLOGY
End: 2022-11-15

## 2022-11-14 PROBLEM — D07.4 CARCINOMA IN SITU OF PENIS: Status: ACTIVE | Noted: 2022-11-14

## 2022-11-14 PROCEDURE — OTHER PRESCRIPTION MEDICATION MANAGEMENT: OTHER

## 2022-11-14 PROCEDURE — OTHER PRESCRIPTION: OTHER

## 2022-11-14 PROCEDURE — 99213 OFFICE O/P EST LOW 20 MIN: CPT

## 2022-11-14 PROCEDURE — OTHER PATHOLOGY DISCUSSION: OTHER

## 2022-11-14 PROCEDURE — OTHER COUNSELING: OTHER

## 2022-11-14 RX ORDER — IMIQUIMOD 12.5 MG/.25G
CREAM TOPICAL
Qty: 24 | Refills: 6 | Status: ACTIVE

## 2022-11-14 NOTE — PROCEDURE: PRESCRIPTION MEDICATION MANAGEMENT
Plan: Will treat for 2 more months then stop for a month then will f/u here
Detail Level: Simple
Continue Regimen: Imiquimod started on Oct 10, 2022
Render In Strict Bullet Format?: No

## 2022-11-23 ENCOUNTER — ANTI-COAG VISIT (OUTPATIENT)
Dept: ANTICOAGULATION | Facility: CLINIC | Age: 67
End: 2022-11-23

## 2022-11-23 DIAGNOSIS — D68.9 BLOOD CLOTTING DISORDER (HCC): ICD-10-CM

## 2022-11-23 DIAGNOSIS — D68.59 HYPERCOAGULOPATHY (HCC): ICD-10-CM

## 2022-11-23 DIAGNOSIS — Z79.01 LONG TERM (CURRENT) USE OF ANTICOAGULANTS: ICD-10-CM

## 2022-11-23 LAB — INR: 2.5 (ref 0.8–1.2)

## 2022-11-23 PROCEDURE — 93793 ANTICOAG MGMT PT WARFARIN: CPT | Performed by: INTERNAL MEDICINE

## 2022-12-17 LAB — INR: 2.7 (ref 0.8–1.2)

## 2022-12-19 ENCOUNTER — ANTI-COAG VISIT (OUTPATIENT)
Dept: ANTICOAGULATION | Facility: CLINIC | Age: 67
End: 2022-12-19

## 2022-12-19 DIAGNOSIS — Z79.01 LONG TERM (CURRENT) USE OF ANTICOAGULANTS: ICD-10-CM

## 2022-12-19 DIAGNOSIS — D68.59 HYPERCOAGULOPATHY (HCC): ICD-10-CM

## 2022-12-19 DIAGNOSIS — D68.9 BLOOD CLOTTING DISORDER (HCC): ICD-10-CM

## 2022-12-29 ENCOUNTER — TELEPHONE (OUTPATIENT)
Dept: GASTROENTEROLOGY | Facility: CLINIC | Age: 67
End: 2022-12-29

## 2022-12-29 NOTE — TELEPHONE ENCOUNTER
Fax received from [address] [place] [address]. # [phone]  Fax# [phone]    It is requesting prior authorization for [employer] xs 6 patient test sold by - box 6/ea 102 Total Billed $3,685.60    Date of service marked as 12/28/22    I called deyanira because Dr. Bryson Husain did not order this and we have not seen the patient since May (over 6 months)    I spoke to Herminia. She told me to disregard the fax and they will reach out to PCP.

## 2023-01-16 DIAGNOSIS — Z00.00 ADULT GENERAL MEDICAL EXAM: ICD-10-CM

## 2023-01-16 RX ORDER — AMLODIPINE BESYLATE 10 MG/1
10 TABLET ORAL DAILY
Qty: 90 TABLET | Refills: 1 | Status: SHIPPED | OUTPATIENT
Start: 2023-01-16

## 2023-01-16 NOTE — TELEPHONE ENCOUNTER
Tried calling pt- phone rang and then got busy tone- refill passes protocol but notes list pt is to call with blood pressure readings- did not see any in chart. Called to obtain readings. Requested Prescriptions   Pending Prescriptions Disp Refills    amLODIPine 10 MG Oral Tab 90 tablet 1     Sig: Take 1 tablet (10 mg total) by mouth daily.        Hypertensive Medications Protocol Passed - 1/16/2023 10:47 AM        Passed - In person appointment in the past 12 or next 3 months     Recent Outpatient Visits              5 months ago Adult general medical exam    Sandra Izaguirre MD    Office Visit    1 year ago Essential hypertension    [address], [address], [place]    Nurse Only    1 year ago [facility]    Office Visit    2 years ago Physical exam, annual    [address], [address], [place]    Office Visit    3 years ago [facility] exam, [facility]    [address] [facility]    [facility] Visit                      Passed - Last BP reading less than 140/90     BP Readings from Last 1 Encounters:  08/04/22 : 120/80              Passed - CMP or BMP in past 6 months     Recent Results (from the past 4392 hour(s))   COMP METABOLIC PANEL (14)    Collection Time: 08/04/22 11:21 AM   Result Value Ref Range    Glucose 99 70 - 99 mg/dL    Sodium 140 136 - 145 mmol/L    Potassium 4.7 3.5 - 5.1 mmol/L    Chloride 109 98 - 112 mmol/L    CO2 28.0 21.0 - 32.0 mmol/L    Anion Gap 3 0 - 18 mmol/L    BUN 11 7 - 18 mg/dL    Creatinine 1.01 0.70 - 1.30 mg/dL    BUN/CREA Ratio 10.9 10.0 - 20.0    Calcium, Total 8.8 8.5 - 10.1 mg/dL    Calculated Osmolality 289 275 - 295 mOsm/kg    eGFR-Cr 81 >=60 mL/min/1.73m2    ALT 21 16 - 61 U/L    AST 18 15 - 37 U/L    Alkaline Phosphatase 60 45 - 117 U/L    Bilirubin, Total 0.6 0.1 - 2.0 mg/dL    Total Protein 6.6 6.4 - 8.2 g/dL    Albumin 3.4 3.4 - 5.0 g/dL    Globulin  3.2 2.8 - 4.4 g/dL    A/G Ratio 1.1 1.0 - 2.0    Patient Fasting for CMP? Yes      *Note: Due to a large number of results and/or encounters for the requested time period, some results have not been displayed. A complete set of results can be found in Results Review.                Passed - In person appointment or virtual visit in the past 6 months     Recent Outpatient Visits              5 months ago Adult general medical exam    [address], Russell Virk MD    Office Visit    1 year ago Essential hypertension    [address], [address], [place]    Nurse Only    1 year ago Adult general medical exam    Russell Hanna MD    Office Visit    2 years ago Physical exam, annual    Luigi Hanna MD    Office Visit    3 years ago Physical exam, annual    Luigi Hanna MD    Office Visit                      Passed - [facility] or GFRNAA > 50     GFR Evaluation  EGFRCR: 81 , resulted on 8/4/2022

## 2023-01-24 ENCOUNTER — ANTI-COAG VISIT (OUTPATIENT)
Dept: ANTICOAGULATION | Facility: CLINIC | Age: 68
End: 2023-01-24

## 2023-01-24 DIAGNOSIS — D68.9 BLOOD CLOTTING DISORDER (HCC): ICD-10-CM

## 2023-01-24 DIAGNOSIS — D68.59 HYPERCOAGULOPATHY (HCC): ICD-10-CM

## 2023-01-24 DIAGNOSIS — Z79.01 LONG TERM (CURRENT) USE OF ANTICOAGULANTS: ICD-10-CM

## 2023-01-24 LAB — INR: 3 (ref 0.8–1.2)

## 2023-01-24 PROCEDURE — 93793 ANTICOAG MGMT PT WARFARIN: CPT | Performed by: INTERNAL MEDICINE

## 2023-01-25 NOTE — PROGRESS NOTES
INR result received from [employer] home [facility]. Per protocol, patient to continue current dose and recheck INR in 2-4 weeks.

## 2023-02-10 ENCOUNTER — APPOINTMENT (OUTPATIENT)
Dept: URBAN - METROPOLITAN AREA CLINIC 244 | Age: 68
Setting detail: DERMATOLOGY
End: 2023-02-14

## 2023-02-10 DIAGNOSIS — Z86.007 PERSONAL HISTORY OF IN-SITU NEOPLASM OF SKIN: ICD-10-CM

## 2023-02-10 PROCEDURE — OTHER COUNSELING: OTHER

## 2023-02-10 PROCEDURE — 99212 OFFICE O/P EST SF 10 MIN: CPT

## 2023-02-10 PROCEDURE — OTHER PRESCRIPTION MEDICATION MANAGEMENT: OTHER

## 2023-02-10 ASSESSMENT — LOCATION DETAILED DESCRIPTION DERM: LOCATION DETAILED: RIGHT LATERAL MID-DORSAL PENILE SHAFT

## 2023-02-10 ASSESSMENT — LOCATION ZONE DERM: LOCATION ZONE: PENIS

## 2023-02-10 ASSESSMENT — LOCATION SIMPLE DESCRIPTION DERM: LOCATION SIMPLE: DORSAL PENIS

## 2023-02-18 LAB — INR: 2.1 (ref 2–3)

## 2023-02-20 ENCOUNTER — MED REC SCAN ONLY (OUTPATIENT)
Dept: INTERNAL MEDICINE CLINIC | Facility: CLINIC | Age: 68
End: 2023-02-20

## 2023-02-20 ENCOUNTER — ANTI-COAG VISIT (OUTPATIENT)
Dept: ANTICOAGULATION | Facility: CLINIC | Age: 68
End: 2023-02-20

## 2023-02-20 DIAGNOSIS — D68.59 HYPERCOAGULOPATHY (HCC): ICD-10-CM

## 2023-02-20 DIAGNOSIS — Z79.01 LONG TERM (CURRENT) USE OF ANTICOAGULANTS: ICD-10-CM

## 2023-02-20 DIAGNOSIS — D68.9 BLOOD CLOTTING DISORDER (HCC): ICD-10-CM

## 2023-02-20 PROCEDURE — 93793 ANTICOAG MGMT PT WARFARIN: CPT | Performed by: INTERNAL MEDICINE

## 2023-02-20 RX ORDER — WARFARIN SODIUM 5 MG/1
5 TABLET ORAL NIGHTLY
Qty: 90 TABLET | Refills: 1 | Status: SHIPPED | OUTPATIENT
Start: 2023-02-20

## 2023-02-20 NOTE — PROGRESS NOTES
2/20/23: Result received from [employer] home monitoring. INR within goal range. Per protocol, continue current dose and recheck INR 2-4 weeks.

## 2023-02-20 NOTE — TELEPHONE ENCOUNTER
Refill passed per [facility] protocol. Requested Prescriptions   Pending Prescriptions Disp Refills    warfarin 5 MG Oral Tab 90 tablet 1     Sig: Take 1 tablet (5 mg total) by mouth nightly. Rx Em Warfarin Protocol Passed - 2/20/2023 11:14 AM        Passed - Appointment in past 12 or next 3 months     Recent Outpatient Visits              6 months ago Adult general medical exam    [address], [address] [facility], MD Sandra    Office Visit    1 year ago Essential hypertension    [address], [address], [place]    Nurse Only    1 year ago Adult general medical exam    [address] [facility], MD Sandra    Office Visit    2 years ago Physical exam, annual    [address] [facility], [place]    Office Visit    3 years ago Physical exam, annual    [address] [facility], [place] Wanda Cuellar MD    Office Visit                      Passed - INR 3.9 or less past 6 weeks     INR   Date Value Ref Range Status   02/18/2023 2.1 (A) 2.0 - 3.0 Final                   Passed - CMP within past 12 months     No results found for this or any previous visit (from the past 26 hour(s)).                 Passed - AST < 111 (less than 3 Xs the upper limit)     Lab Results   Component Value Date    AST 18 08/04/2022                     Passed - ALT < 168 (less than 3Xs the upper limit)     Lab Results   Component Value Date    ALT 21 08/04/2022                     Passed - CBC within the past 12 months     Recent Results (from the past 8760 hour(s))   CBC, PLATELET; NO DIFFERENTIAL    Collection Time: 08/04/22 11:21 AM   Result Value Ref Range    WBC 4.6 4.0 - 11.0 x10(3) uL    RBC 4.19 3.80 - 5.80 x10(6)uL    HGB 13.8 13.0 - 17.5 g/dL    HCT 42.0 39.0 - 53.0 %    .2 (H) 80.0 - 100.0 fL    MCH 32.9 26.0 - 34.0 pg    MCHC 32.9 31.0 - 37.0 g/dL RDW 12.8 11.0 - 15.0 %    RDW-SD 47.7 (H) 35.1 - 46.3 fL    .0 150.0 - 450.0 10(3)uL     *Note: Due to a large number of results and/or encounters for the requested time period, some results have not been displayed. A complete set of results can be found in Results Review.                  Passed - Hgb >/= 10g/dl within past 12 months     HGB   Date Value Ref Range Status   08/04/2022 13.8 13.0 - 17.5 g/dL Final     HEMOGLOBIN   Date Value Ref Range Status   11/30/2016 14.4 13.2 - 17.1 g/dL Final                   Passed - Platelets >/= 856 past 12 months     PLATELET COUNT   Date Value Ref Range Status   11/30/2016 164 140 - 400 Thousand/uL Final     PLT   Date Value Ref Range Status   08/04/2022 176.0 150.0 - 450.0 10(3)uL Final

## 2023-02-22 ENCOUNTER — NURSE TRIAGE (OUTPATIENT)
Dept: INTERNAL MEDICINE CLINIC | Facility: CLINIC | Age: 68
End: 2023-02-22

## 2023-02-22 ENCOUNTER — TELEPHONE (OUTPATIENT)
Dept: OPHTHALMOLOGY | Facility: CLINIC | Age: 68
End: 2023-02-22

## 2023-02-22 DIAGNOSIS — H02.89 PAIN OF LEFT EYELID: Primary | ICD-10-CM

## 2023-02-22 NOTE — TELEPHONE ENCOUNTER
Patient contacted and notified. Ophthalmology number provided. Patient will schedule with primary care if unable to get a soon appointment.

## 2023-02-22 NOTE — TELEPHONE ENCOUNTER
Received a call from Nurse Triage stating pt is concerned he has a chalazion that doesn't really be seem to be going away. Pt states he has been doing warm compresses about 4x a day and feels they help a bit but not enough to diminish the bump. Told pt to increase the warm compresses (20-30x a day) and can even try a Samuel mask that he can find on [employer]. Pt states he will try to increase the warm compresses for about a week or two and will call back if he had no improvement.

## 2023-02-23 ENCOUNTER — TELEPHONE (OUTPATIENT)
Dept: OPHTHALMOLOGY | Facility: CLINIC | Age: 68
End: 2023-02-23

## 2023-02-23 NOTE — TELEPHONE ENCOUNTER
Patient would like to schedule a late afternoon appointment for a new patient appointment for left eye lid pain and patient also has some swelling. Patient states that he is not available next week Tuesday.

## 2023-02-23 NOTE — TELEPHONE ENCOUNTER
Spoke to patient. Pt complains of swelling and a bump LLL x one month. Pt denies pain or blurry vision. Pt states he has tried warm compresses but is only able to do them 3 times per day. Pt advised to increase warm compresses when he can.  Pt scheduled for an evaluation on 3/2/23 @ 4:00pm

## 2023-03-02 ENCOUNTER — OFFICE VISIT (OUTPATIENT)
Dept: OPHTHALMOLOGY | Facility: CLINIC | Age: 68
End: 2023-03-02

## 2023-03-02 DIAGNOSIS — H00.15 CHALAZION LEFT LOWER EYELID: Primary | ICD-10-CM

## 2023-03-02 PROCEDURE — 99203 OFFICE O/P NEW LOW 30 MIN: CPT | Performed by: OPHTHALMOLOGY

## 2023-03-02 NOTE — ASSESSMENT & PLAN NOTE
Since patient has had this for over a month; increase warm compresses 30+ times a day. If no improvement then he can call Dr. Jigna Segal for consultation and treatment. Information given.

## 2023-03-02 NOTE — PATIENT INSTRUCTIONS
Chalazion left lower eyelid  Since patient has had this for over a month; increase warm compresses 30+ times a day. If no improvement then he can call Dr. Kelby Pena for consultation and treatment. Information given.

## 2023-03-18 LAB — INR: 2.1 (ref 2–3)

## 2023-03-20 ENCOUNTER — ANTI-COAG VISIT (OUTPATIENT)
Dept: ANTICOAGULATION | Facility: CLINIC | Age: 68
End: 2023-03-20

## 2023-03-20 DIAGNOSIS — Z79.01 LONG TERM (CURRENT) USE OF ANTICOAGULANTS: ICD-10-CM

## 2023-03-20 DIAGNOSIS — D68.59 HYPERCOAGULOPATHY (HCC): ICD-10-CM

## 2023-03-20 DIAGNOSIS — D68.9 BLOOD CLOTTING DISORDER (HCC): ICD-10-CM

## 2023-03-20 NOTE — PROGRESS NOTES
3/20/23: Result received from [facility] home [facility]. INR within goal range. Per protocol, continue current dose and recheck INR 2-4 weeks.

## 2023-04-22 LAB — INR: 2.2 (ref 2–3)

## 2023-04-26 ENCOUNTER — ANTI-COAG VISIT (OUTPATIENT)
Dept: ANTICOAGULATION | Facility: CLINIC | Age: 68
End: 2023-04-26

## 2023-04-26 DIAGNOSIS — D68.59 HYPERCOAGULOPATHY (HCC): ICD-10-CM

## 2023-04-26 DIAGNOSIS — Z79.01 LONG TERM (CURRENT) USE OF ANTICOAGULANTS: ICD-10-CM

## 2023-04-26 DIAGNOSIS — D68.9 BLOOD CLOTTING DISORDER (HCC): ICD-10-CM

## 2023-04-26 PROCEDURE — 93793 ANTICOAG MGMT PT WARFARIN: CPT | Performed by: INTERNAL MEDICINE

## 2023-04-26 NOTE — PROGRESS NOTES
Result received from [facility] home [facility]. INR within goal range. Per protocol, continue current dose and recheck INR 2-4 weeks.

## 2023-05-24 ENCOUNTER — ANTI-COAG VISIT (OUTPATIENT)
Dept: ANTICOAGULATION | Facility: CLINIC | Age: 68
End: 2023-05-24

## 2023-05-24 DIAGNOSIS — D68.59 HYPERCOAGULOPATHY (HCC): ICD-10-CM

## 2023-05-24 DIAGNOSIS — Z79.01 LONG TERM (CURRENT) USE OF ANTICOAGULANTS: ICD-10-CM

## 2023-05-24 DIAGNOSIS — D68.9 BLOOD CLOTTING DISORDER (HCC): ICD-10-CM

## 2023-05-24 LAB — INR: 2.4 (ref 0.8–1.2)

## 2023-05-24 PROCEDURE — 93793 ANTICOAG MGMT PT WARFARIN: CPT | Performed by: INTERNAL MEDICINE

## 2023-06-19 ENCOUNTER — APPOINTMENT (OUTPATIENT)
Dept: URBAN - METROPOLITAN AREA CLINIC 244 | Age: 68
Setting detail: DERMATOLOGY
End: 2023-06-20

## 2023-06-19 DIAGNOSIS — Z86.007 PERSONAL HISTORY OF IN-SITU NEOPLASM OF SKIN: ICD-10-CM

## 2023-06-19 PROCEDURE — 99212 OFFICE O/P EST SF 10 MIN: CPT

## 2023-06-19 PROCEDURE — OTHER PRESCRIPTION MEDICATION MANAGEMENT: OTHER

## 2023-06-19 PROCEDURE — OTHER COUNSELING: OTHER

## 2023-06-19 ASSESSMENT — LOCATION SIMPLE DESCRIPTION DERM: LOCATION SIMPLE: DORSAL PENIS

## 2023-06-19 ASSESSMENT — LOCATION DETAILED DESCRIPTION DERM: LOCATION DETAILED: RIGHT LATERAL MID-DORSAL PENILE SHAFT

## 2023-06-19 ASSESSMENT — LOCATION ZONE DERM: LOCATION ZONE: PENIS

## 2023-06-19 NOTE — HPI: FULL BODY SKIN EXAMINATION
What Is The Reason For Today's Visit?: Skin Lesion
What Is The Reason For Today's Visit? (Being Monitored For X): concerning skin lesions on a periodic basis

## 2023-06-24 LAB — INR: 2.6 (ref 2–3)

## 2023-06-26 ENCOUNTER — TELEPHONE (OUTPATIENT)
Dept: ANTICOAGULATION | Facility: CLINIC | Age: 68
End: 2023-06-26

## 2023-06-26 ENCOUNTER — ANTI-COAG VISIT (OUTPATIENT)
Dept: ANTICOAGULATION | Facility: CLINIC | Age: 68
End: 2023-06-26

## 2023-06-26 DIAGNOSIS — D68.9 BLOOD CLOTTING DISORDER (HCC): Primary | ICD-10-CM

## 2023-06-26 DIAGNOSIS — Z79.01 MONITORING FOR LONG-TERM ANTICOAGULANT USE: ICD-10-CM

## 2023-06-26 DIAGNOSIS — D68.59 HYPERCOAGULOPATHY (HCC): ICD-10-CM

## 2023-06-26 DIAGNOSIS — D68.59 HYPERCOAGULOPATHY (HCC): Primary | ICD-10-CM

## 2023-06-26 DIAGNOSIS — Z51.81 MONITORING FOR LONG-TERM ANTICOAGULANT USE: ICD-10-CM

## 2023-06-26 DIAGNOSIS — Z79.01 LONG TERM (CURRENT) USE OF ANTICOAGULANTS: ICD-10-CM

## 2023-06-26 PROCEDURE — 93793 ANTICOAG MGMT PT WARFARIN: CPT | Performed by: INTERNAL MEDICINE

## 2023-06-26 NOTE — PROGRESS NOTES
Result received from [facility]. INR within goal range. Per protocol, continue current dose and recheck INR 1-2 weeks.     5 mg every day

## 2023-07-14 DIAGNOSIS — Z00.00 ADULT GENERAL MEDICAL EXAM: ICD-10-CM

## 2023-07-14 NOTE — TELEPHONE ENCOUNTER
Current Outpatient Medications:       amLODIPine 10 MG Oral Tab, Take 1 tablet (10 mg total) by mouth daily. , Disp: 90 tablet, Rfl: 1

## 2023-07-14 NOTE — TELEPHONE ENCOUNTER
Please review. Protocol Failed or has No Protocol. Requested Prescriptions   Pending Prescriptions Disp Refills    amLODIPine 10 MG Oral Tab 90 tablet 1     Sig: Take 1 tablet (10 mg total) by mouth daily. Hypertensive Medications Protocol Failed - 7/14/2023  9:52 AM        Failed - CMP or BMP in past 6 months     No results found for this or any previous visit (from the past 4392 hour(s)).             Failed - In person appointment or virtual visit in the past 6 months     Recent Outpatient Visits              4 months ago Chalazion left lower eyelid    [address], [address], Catie Wang MD    Office Visit    11 months ago Adult general medical exam    Jah Almanza MD    Office Visit    1 year ago Essential hypertension    [address], [address] [facility] [address] [facility] [place]    Nurse Only    1 year ago Adult general medical exam    [address], [address] [facility] [address] [facility], Jah Meredith MD    Office Visit    3 years ago Physical exam, annual    [address] [facility], Rockey Dakin, MD    Office Visit          Future Appointments         Provider Department Appt Notes    In 2 months Tree Sanchez MD [address], [address] [facility] Physical - last px 8/4/2022               Passed - In person appointment in the past 12 or next 3 months     Recent Outpatient Visits              4 months ago Chalazion left lower eyelid    [facility]    Office Visit    11 months ago Adult general medical exam    Jah Almanza MD    Office Visit    1 year ago Essential hypertension    [address], [address] [facility] [place]    Nurse Only    1 year ago Adult general medical exam    [address], [address] [facility], Floyd Escobar MD    Office Visit    3 years ago Physical exam, annual    [address] [facility] Luigi Vega MD    Office Visit          Future Appointments         Provider Department Appt Notes    In 2 months Florence Mast MD [address], [address] [facility] Physical - last px 8/4/2022               Passed - Last BP reading less than 140/90     BP Readings from Last 1 Encounters:  08/04/22 : 120/80              Passed - EGFRCR or GFRNAA > 50     GFR Evaluation  EGFRCR: 81 , resulted on 8/4/2022               Recent Outpatient Visits              4 months ago Chalazion left lower eyelid    [address], [address], [place]    Office Visit    11 months ago Adult general medical exam    Roma Bey, Floyd Escobar MD    Office Visit    1 year ago Essential hypertension    [address], [address], [place]    Nurse Only    1 year ago Adult general medical exam    [address], [address], Floyd Escobar MD    Office Visit    3 years ago Physical exam, annual    [address], Luigi Vega MD    Office Visit            Future Appointments         Provider Department Appt Notes    In 2 months Florence Mast MD [address], [address] [facility] Physical - last px 8/4/2022

## 2023-07-15 RX ORDER — AMLODIPINE BESYLATE 10 MG/1
10 TABLET ORAL DAILY
Qty: 90 TABLET | Refills: 0 | Status: SHIPPED | OUTPATIENT
Start: 2023-07-15

## 2023-07-22 LAB — INR: 2.5 (ref 2–3)

## 2023-07-24 ENCOUNTER — ANTI-COAG VISIT (OUTPATIENT)
Dept: ANTICOAGULATION | Facility: CLINIC | Age: 68
End: 2023-07-24

## 2023-07-24 DIAGNOSIS — Z79.01 LONG TERM (CURRENT) USE OF ANTICOAGULANTS: ICD-10-CM

## 2023-07-24 DIAGNOSIS — Z51.81 MONITORING FOR LONG-TERM ANTICOAGULANT USE: ICD-10-CM

## 2023-07-24 DIAGNOSIS — Z79.01 MONITORING FOR LONG-TERM ANTICOAGULANT USE: ICD-10-CM

## 2023-07-24 DIAGNOSIS — D68.9 BLOOD CLOTTING DISORDER (HCC): Primary | ICD-10-CM

## 2023-07-24 DIAGNOSIS — D68.59 HYPERCOAGULOPATHY (HCC): ICD-10-CM

## 2023-07-24 PROCEDURE — 93793 ANTICOAG MGMT PT WARFARIN: CPT | Performed by: FAMILY MEDICINE

## 2023-07-24 PROCEDURE — 93793 ANTICOAG MGMT PT WARFARIN: CPT | Performed by: INTERNAL MEDICINE

## 2023-07-24 NOTE — PROGRESS NOTES
Result received from [facility]. INR within goal range. Per protocol, continue current dose as noted below and recheck INR 2-4 weeks.     5 mg every day

## 2023-08-23 ENCOUNTER — ANTI-COAG VISIT (OUTPATIENT)
Dept: ANTICOAGULATION | Facility: CLINIC | Age: 68
End: 2023-08-23

## 2023-08-23 DIAGNOSIS — Z79.01 LONG TERM (CURRENT) USE OF ANTICOAGULANTS: ICD-10-CM

## 2023-08-23 DIAGNOSIS — D68.59 HYPERCOAGULOPATHY (HCC): ICD-10-CM

## 2023-08-23 DIAGNOSIS — Z79.01 MONITORING FOR LONG-TERM ANTICOAGULANT USE: ICD-10-CM

## 2023-08-23 DIAGNOSIS — Z51.81 MONITORING FOR LONG-TERM ANTICOAGULANT USE: ICD-10-CM

## 2023-08-23 DIAGNOSIS — D68.9 BLOOD CLOTTING DISORDER (HCC): Primary | ICD-10-CM

## 2023-08-23 LAB — INR: 2.2 (ref 0.8–1.2)

## 2023-08-23 PROCEDURE — 93793 ANTICOAG MGMT PT WARFARIN: CPT | Performed by: FAMILY MEDICINE

## 2023-08-23 NOTE — PROGRESS NOTES
INR result received from [facility]. Per protocol, continue current dose and recheck INR in 2-4 weeks.     5 mg every day

## 2023-09-25 ENCOUNTER — ANTI-COAG VISIT (OUTPATIENT)
Dept: ANTICOAGULATION | Facility: CLINIC | Age: 68
End: 2023-09-25

## 2023-09-25 DIAGNOSIS — Z79.01 MONITORING FOR LONG-TERM ANTICOAGULANT USE: ICD-10-CM

## 2023-09-25 DIAGNOSIS — D68.9 BLOOD CLOTTING DISORDER (HCC): Primary | ICD-10-CM

## 2023-09-25 DIAGNOSIS — Z51.81 MONITORING FOR LONG-TERM ANTICOAGULANT USE: ICD-10-CM

## 2023-09-25 DIAGNOSIS — Z79.01 LONG TERM (CURRENT) USE OF ANTICOAGULANTS: ICD-10-CM

## 2023-09-25 DIAGNOSIS — D68.59 HYPERCOAGULOPATHY (HCC): ICD-10-CM

## 2023-09-25 LAB — INR: 2.3 (ref 2–3)

## 2023-09-25 PROCEDURE — 93793 ANTICOAG MGMT PT WARFARIN: CPT | Performed by: FAMILY MEDICINE

## 2023-09-25 NOTE — PROGRESS NOTES
INR received from [address] [place] [address]. INR within goal continue current dose noted below.     5 mg every day

## 2023-10-06 ENCOUNTER — APPOINTMENT (OUTPATIENT)
Dept: URBAN - METROPOLITAN AREA CLINIC 244 | Age: 68
Setting detail: DERMATOLOGY
End: 2023-10-08

## 2023-10-06 ENCOUNTER — LAB ENCOUNTER (OUTPATIENT)
Dept: LAB | Facility: HOSPITAL | Age: 68
End: 2023-10-06
Attending: INTERNAL MEDICINE
Payer: COMMERCIAL

## 2023-10-06 ENCOUNTER — OFFICE VISIT (OUTPATIENT)
Facility: CLINIC | Age: 68
End: 2023-10-06

## 2023-10-06 VITALS
DIASTOLIC BLOOD PRESSURE: 78 MMHG | BODY MASS INDEX: 28.56 KG/M2 | OXYGEN SATURATION: 98 % | HEART RATE: 78 BPM | SYSTOLIC BLOOD PRESSURE: 120 MMHG | HEIGHT: 71 IN | WEIGHT: 204 LBS | RESPIRATION RATE: 14 BRPM

## 2023-10-06 DIAGNOSIS — E78.5 DYSLIPIDEMIA: ICD-10-CM

## 2023-10-06 DIAGNOSIS — L82.1 OTHER SEBORRHEIC KERATOSIS: ICD-10-CM

## 2023-10-06 DIAGNOSIS — Z00.00 ADULT GENERAL MEDICAL EXAM: Primary | ICD-10-CM

## 2023-10-06 DIAGNOSIS — L81.4 OTHER MELANIN HYPERPIGMENTATION: ICD-10-CM

## 2023-10-06 DIAGNOSIS — D22 MELANOCYTIC NEVI: ICD-10-CM

## 2023-10-06 DIAGNOSIS — B35.3 TINEA PEDIS: ICD-10-CM

## 2023-10-06 DIAGNOSIS — Z00.00 ADULT GENERAL MEDICAL EXAM: ICD-10-CM

## 2023-10-06 DIAGNOSIS — Z23 INFLUENZA VACCINE NEEDED: ICD-10-CM

## 2023-10-06 PROBLEM — D22.5 MELANOCYTIC NEVI OF TRUNK: Status: ACTIVE | Noted: 2023-10-06

## 2023-10-06 LAB
ALBUMIN SERPL-MCNC: 3.6 G/DL (ref 3.4–5)
ALBUMIN/GLOB SERPL: 1.1 {RATIO} (ref 1–2)
ALP LIVER SERPL-CCNC: 56 U/L
ALT SERPL-CCNC: 26 U/L
ANION GAP SERPL CALC-SCNC: 5 MMOL/L (ref 0–18)
AST SERPL-CCNC: 21 U/L (ref 15–37)
BILIRUB SERPL-MCNC: 0.4 MG/DL (ref 0.1–2)
BUN BLD-MCNC: 12 MG/DL (ref 7–18)
BUN/CREAT SERPL: 11 (ref 10–20)
CALCIUM BLD-MCNC: 8.9 MG/DL (ref 8.5–10.1)
CHLORIDE SERPL-SCNC: 110 MMOL/L (ref 98–112)
CHOLEST SERPL-MCNC: 144 MG/DL (ref ?–200)
CO2 SERPL-SCNC: 28 MMOL/L (ref 21–32)
COMPLEXED PSA SERPL-MCNC: 2.42 NG/ML (ref ?–4)
CREAT BLD-MCNC: 1.09 MG/DL
DEPRECATED RDW RBC AUTO: 47.3 FL (ref 35.1–46.3)
EGFRCR SERPLBLD CKD-EPI 2021: 73 ML/MIN/1.73M2 (ref 60–?)
ERYTHROCYTE [DISTWIDTH] IN BLOOD BY AUTOMATED COUNT: 13.1 % (ref 11–15)
EST. AVERAGE GLUCOSE BLD GHB EST-MCNC: 114 MG/DL (ref 68–126)
FASTING PATIENT LIPID ANSWER: YES
FASTING STATUS PATIENT QL REPORTED: YES
GLOBULIN PLAS-MCNC: 3.2 G/DL (ref 2.8–4.4)
GLUCOSE BLD-MCNC: 98 MG/DL (ref 70–99)
HBA1C MFR BLD: 5.6 % (ref ?–5.7)
HCT VFR BLD AUTO: 40.5 %
HDLC SERPL-MCNC: 50 MG/DL (ref 40–59)
HGB BLD-MCNC: 13.8 G/DL
LDLC SERPL CALC-MCNC: 79 MG/DL (ref ?–100)
MCH RBC QN AUTO: 33.7 PG (ref 26–34)
MCHC RBC AUTO-ENTMCNC: 34.1 G/DL (ref 31–37)
MCV RBC AUTO: 98.8 FL
NONHDLC SERPL-MCNC: 94 MG/DL (ref ?–130)
OSMOLALITY SERPL CALC.SUM OF ELEC: 296 MOSM/KG (ref 275–295)
PLATELET # BLD AUTO: 175 10(3)UL (ref 150–450)
POTASSIUM SERPL-SCNC: 4.9 MMOL/L (ref 3.5–5.1)
PROT SERPL-MCNC: 6.8 G/DL (ref 6.4–8.2)
RBC # BLD AUTO: 4.1 X10(6)UL
SODIUM SERPL-SCNC: 143 MMOL/L (ref 136–145)
TRIGL SERPL-MCNC: 76 MG/DL (ref 30–149)
TSI SER-ACNC: 0.99 MIU/ML (ref 0.36–3.74)
VLDLC SERPL CALC-MCNC: 12 MG/DL (ref 0–30)
WBC # BLD AUTO: 4.9 X10(3) UL (ref 4–11)

## 2023-10-06 PROCEDURE — 84443 ASSAY THYROID STIM HORMONE: CPT

## 2023-10-06 PROCEDURE — 80053 COMPREHEN METABOLIC PANEL: CPT

## 2023-10-06 PROCEDURE — 85027 COMPLETE CBC AUTOMATED: CPT

## 2023-10-06 PROCEDURE — 90662 IIV NO PRSV INCREASED AG IM: CPT | Performed by: INTERNAL MEDICINE

## 2023-10-06 PROCEDURE — 36415 COLL VENOUS BLD VENIPUNCTURE: CPT

## 2023-10-06 PROCEDURE — OTHER PRESCRIPTION: OTHER

## 2023-10-06 PROCEDURE — OTHER PRESCRIPTION MEDICATION MANAGEMENT: OTHER

## 2023-10-06 PROCEDURE — 3078F DIAST BP <80 MM HG: CPT | Performed by: INTERNAL MEDICINE

## 2023-10-06 PROCEDURE — 99397 PER PM REEVAL EST PAT 65+ YR: CPT | Performed by: INTERNAL MEDICINE

## 2023-10-06 PROCEDURE — 83036 HEMOGLOBIN GLYCOSYLATED A1C: CPT

## 2023-10-06 PROCEDURE — 99213 OFFICE O/P EST LOW 20 MIN: CPT

## 2023-10-06 PROCEDURE — 3074F SYST BP LT 130 MM HG: CPT | Performed by: INTERNAL MEDICINE

## 2023-10-06 PROCEDURE — 80061 LIPID PANEL: CPT

## 2023-10-06 PROCEDURE — 90471 IMMUNIZATION ADMIN: CPT | Performed by: INTERNAL MEDICINE

## 2023-10-06 PROCEDURE — 3008F BODY MASS INDEX DOCD: CPT | Performed by: INTERNAL MEDICINE

## 2023-10-06 PROCEDURE — OTHER COUNSELING: OTHER

## 2023-10-06 RX ORDER — SIMVASTATIN 20 MG
20 TABLET ORAL NIGHTLY
Qty: 90 TABLET | Refills: 3 | Status: SHIPPED | OUTPATIENT
Start: 2023-10-06

## 2023-10-06 RX ORDER — WARFARIN SODIUM 5 MG/1
5 TABLET ORAL NIGHTLY
Qty: 90 TABLET | Refills: 3 | Status: SHIPPED | OUTPATIENT
Start: 2023-10-06

## 2023-10-06 RX ORDER — AMLODIPINE BESYLATE 10 MG/1
10 TABLET ORAL DAILY
Qty: 90 TABLET | Refills: 3 | Status: SHIPPED | OUTPATIENT
Start: 2023-10-06

## 2023-10-06 RX ORDER — KETOCONAZOLE 20 MG/G
CREAM TOPICAL
Qty: 60 | Refills: 2 | Status: ERX | COMMUNITY
Start: 2023-10-06

## 2023-10-06 ASSESSMENT — LOCATION DETAILED DESCRIPTION DERM
LOCATION DETAILED: PERIUMBILICAL SKIN
LOCATION DETAILED: LEFT DORSAL 4TH TOE
LOCATION DETAILED: LEFT DORSAL 2ND TOE
LOCATION DETAILED: LEFT DORSAL 3RD TOE
LOCATION DETAILED: RIGHT DORSAL 3RD TOE
LOCATION DETAILED: RIGHT DORSAL GREAT TOE
LOCATION DETAILED: RIGHT DORSAL 5TH TOE
LOCATION DETAILED: RIGHT DORSAL 2ND TOE
LOCATION DETAILED: RIGHT DORSAL 4TH TOE
LOCATION DETAILED: LEFT DORSAL 5TH TOE
LOCATION DETAILED: LEFT DORSAL GREAT TOE

## 2023-10-06 ASSESSMENT — LOCATION SIMPLE DESCRIPTION DERM
LOCATION SIMPLE: ABDOMEN
LOCATION SIMPLE: RIGHT 4TH TOE
LOCATION SIMPLE: RIGHT 5TH TOE
LOCATION SIMPLE: RIGHT 2ND TOE
LOCATION SIMPLE: LEFT GREAT TOE
LOCATION SIMPLE: LEFT 5TH TOE
LOCATION SIMPLE: RIGHT 3RD TOE
LOCATION SIMPLE: LEFT 4TH TOE
LOCATION SIMPLE: LEFT 3RD TOE
LOCATION SIMPLE: RIGHT GREAT TOE
LOCATION SIMPLE: LEFT 2ND TOE

## 2023-10-06 ASSESSMENT — LOCATION ZONE DERM
LOCATION ZONE: TOE
LOCATION ZONE: TRUNK

## 2023-10-09 ENCOUNTER — MED REC SCAN ONLY (OUTPATIENT)
Dept: INTERNAL MEDICINE CLINIC | Facility: CLINIC | Age: 68
End: 2023-10-09

## 2023-10-23 ENCOUNTER — ANTI-COAG VISIT (OUTPATIENT)
Dept: ANTICOAGULATION | Facility: CLINIC | Age: 68
End: 2023-10-23

## 2023-10-23 DIAGNOSIS — Z51.81 MONITORING FOR LONG-TERM ANTICOAGULANT USE: ICD-10-CM

## 2023-10-23 DIAGNOSIS — D68.9 BLOOD CLOTTING DISORDER (HCC): Primary | ICD-10-CM

## 2023-10-23 DIAGNOSIS — D68.59 HYPERCOAGULOPATHY (HCC): ICD-10-CM

## 2023-10-23 DIAGNOSIS — Z79.01 LONG TERM (CURRENT) USE OF ANTICOAGULANTS: ICD-10-CM

## 2023-10-23 DIAGNOSIS — Z79.01 MONITORING FOR LONG-TERM ANTICOAGULANT USE: ICD-10-CM

## 2023-10-23 LAB — INR: 2.3 (ref 2–3)

## 2023-10-23 PROCEDURE — 93793 ANTICOAG MGMT PT WARFARIN: CPT | Performed by: FAMILY MEDICINE

## 2023-10-23 NOTE — PROGRESS NOTES
[facility] / INR 2.3,  therapeutic. (Goal 2.5 )    Etiology: stable    Recheck INR 2 weeks.     WARFARIN Plan per protocol: 5 mg every day

## 2023-11-27 ENCOUNTER — ANTI-COAG VISIT (OUTPATIENT)
Dept: ANTICOAGULATION | Facility: CLINIC | Age: 68
End: 2023-11-27

## 2023-11-27 DIAGNOSIS — Z79.01 MONITORING FOR LONG-TERM ANTICOAGULANT USE: ICD-10-CM

## 2023-11-27 DIAGNOSIS — Z51.81 MONITORING FOR LONG-TERM ANTICOAGULANT USE: ICD-10-CM

## 2023-11-27 DIAGNOSIS — D68.9 BLOOD CLOTTING DISORDER (HCC): Primary | ICD-10-CM

## 2023-11-27 DIAGNOSIS — D68.59 HYPERCOAGULOPATHY (HCC): ICD-10-CM

## 2023-11-27 DIAGNOSIS — Z79.01 LONG TERM (CURRENT) USE OF ANTICOAGULANTS: ICD-10-CM

## 2023-11-27 LAB — INR: 2 (ref 2–3)

## 2023-11-27 PROCEDURE — 93793 ANTICOAG MGMT PT WARFARIN: CPT | Performed by: FAMILY MEDICINE

## 2023-11-27 NOTE — PROGRESS NOTES
[facility] / INR 2.0,  therapeutic. (Goal 2.5 )    Etiology: very stable. Recheck INR 2 weeks.     WARFARIN Plan per protocol: 5 mg every day Pt received Pneumovax vaccination. Pt tolerated well and left in NAD

## 2023-12-26 ENCOUNTER — ANTI-COAG VISIT (OUTPATIENT)
Dept: ANTICOAGULATION | Facility: CLINIC | Age: 68
End: 2023-12-26

## 2023-12-26 DIAGNOSIS — Z79.01 LONG TERM (CURRENT) USE OF ANTICOAGULANTS: ICD-10-CM

## 2023-12-26 DIAGNOSIS — Z79.01 MONITORING FOR LONG-TERM ANTICOAGULANT USE: ICD-10-CM

## 2023-12-26 DIAGNOSIS — D68.59 HYPERCOAGULOPATHY (HCC): ICD-10-CM

## 2023-12-26 DIAGNOSIS — Z51.81 MONITORING FOR LONG-TERM ANTICOAGULANT USE: ICD-10-CM

## 2023-12-26 DIAGNOSIS — D68.9 BLOOD CLOTTING DISORDER (HCC): Primary | ICD-10-CM

## 2023-12-26 LAB — INR: 2.6 (ref 2–3)

## 2023-12-26 PROCEDURE — 93793 ANTICOAG MGMT PT WARFARIN: CPT | Performed by: FAMILY MEDICINE

## 2023-12-26 NOTE — PROGRESS NOTES
[facility] / INR 2.6,  therapeutic. (Goal 2.5 ) TTR 80.5 %     Etiology: stable    PLAN: continue the current dose. Recheck INR every 2 weeks.     WARFARIN Plan per protocol: 5 mg every day

## 2024-01-29 ENCOUNTER — ANTI-COAG VISIT (OUTPATIENT)
Dept: ANTICOAGULATION | Facility: CLINIC | Age: 69
End: 2024-01-29

## 2024-01-29 DIAGNOSIS — D68.59 HYPERCOAGULOPATHY (HCC): ICD-10-CM

## 2024-01-29 DIAGNOSIS — Z51.81 MONITORING FOR LONG-TERM ANTICOAGULANT USE: ICD-10-CM

## 2024-01-29 DIAGNOSIS — Z79.01 LONG TERM (CURRENT) USE OF ANTICOAGULANTS: ICD-10-CM

## 2024-01-29 DIAGNOSIS — Z79.01 MONITORING FOR LONG-TERM ANTICOAGULANT USE: ICD-10-CM

## 2024-01-29 DIAGNOSIS — D68.9 BLOOD CLOTTING DISORDER (HCC): Primary | ICD-10-CM

## 2024-01-29 LAB — INR: 2.2 (ref 2–3)

## 2024-01-29 PROCEDURE — 93793 ANTICOAG MGMT PT WARFARIN: CPT | Performed by: FAMILY MEDICINE

## 2024-01-29 NOTE — PROGRESS NOTES
[facility] / INR 2.2,  therapeutic. (Goal 2.5 ) TTR 80.7 %     Etiology: no changes reported    PLAN: continue the current dose.    Recheck INR every 2 weeks.    WARFARIN Plan per protocol: 5 mg every day

## 2024-02-09 ENCOUNTER — TELEPHONE (OUTPATIENT)
Dept: INTERNAL MEDICINE CLINIC | Facility: CLINIC | Age: 69
End: 2024-02-09

## 2024-02-09 DIAGNOSIS — Z79.01 LONG TERM (CURRENT) USE OF ANTICOAGULANTS: Primary | ICD-10-CM

## 2024-02-09 NOTE — TELEPHONE ENCOUNTER
Pt would like a refill on Rx  PT/INR test in vitro strip. Pt stts this does not go to the pharmacy. Pt stts he needs a referral for this order to be sent to his house. Please advise         Current Outpatient Medications   Medication Sig Dispense Refill    PT/INR Test In Vitro Strip Use strips monthly to check PT/INR as direceted 24 strip 1

## 2024-02-09 NOTE — TELEPHONE ENCOUNTER
Patient requested these supplies last in December 2022. I have noted that a referral request should come from the PCP office first and then the DME referral faxed to [facility] for testing supplies to be ordered.     Refer to TE on 12/28/22.

## 2024-02-12 NOTE — TELEPHONE ENCOUNTER
Called patient & informed him I will mail copy of INR strips referral to home address.    Patient will call back with fax # for [employer]. In [place].

## 2024-02-12 NOTE — TELEPHONE ENCOUNTER
Patient called stating fax number for [facility]o [facility] # [phone]. Referral faxed and referral was mailed out today

## 2024-02-16 ENCOUNTER — MED REC SCAN ONLY (OUTPATIENT)
Dept: INTERNAL MEDICINE CLINIC | Facility: CLINIC | Age: 69
End: 2024-02-16

## 2024-02-26 ENCOUNTER — ANTI-COAG VISIT (OUTPATIENT)
Dept: ANTICOAGULATION | Facility: CLINIC | Age: 69
End: 2024-02-26

## 2024-02-26 DIAGNOSIS — D68.59 HYPERCOAGULOPATHY (HCC): ICD-10-CM

## 2024-02-26 DIAGNOSIS — Z51.81 MONITORING FOR LONG-TERM ANTICOAGULANT USE: ICD-10-CM

## 2024-02-26 DIAGNOSIS — D68.9 BLOOD CLOTTING DISORDER (HCC): Primary | ICD-10-CM

## 2024-02-26 DIAGNOSIS — Z79.01 MONITORING FOR LONG-TERM ANTICOAGULANT USE: ICD-10-CM

## 2024-02-26 DIAGNOSIS — Z79.01 LONG TERM (CURRENT) USE OF ANTICOAGULANTS: ICD-10-CM

## 2024-02-26 LAB — INR: 2.8 (ref 2–3)

## 2024-02-26 PROCEDURE — 93793 ANTICOAG MGMT PT WARFARIN: CPT | Performed by: FAMILY MEDICINE

## 2024-02-26 NOTE — PROGRESS NOTES
[facility] / INR 2.8,  therapeutic. (Goal 2.5 ) TTR 80.9 %     Etiology: no changes. Stable.    PLAN: continue the current dose.    Recheck INR every 2 weeks.      WARFARIN Plan per protocol: 5 mg every day

## 2024-03-05 ENCOUNTER — MED REC SCAN ONLY (OUTPATIENT)
Dept: INTERNAL MEDICINE CLINIC | Facility: CLINIC | Age: 69
End: 2024-03-05

## 2024-03-26 ENCOUNTER — ANTI-COAG VISIT (OUTPATIENT)
Dept: ANTICOAGULATION | Facility: CLINIC | Age: 69
End: 2024-03-26

## 2024-03-26 DIAGNOSIS — D68.59 HYPERCOAGULOPATHY (HCC): ICD-10-CM

## 2024-03-26 DIAGNOSIS — Z51.81 MONITORING FOR LONG-TERM ANTICOAGULANT USE: ICD-10-CM

## 2024-03-26 DIAGNOSIS — Z79.01 LONG TERM (CURRENT) USE OF ANTICOAGULANTS: ICD-10-CM

## 2024-03-26 DIAGNOSIS — Z79.01 MONITORING FOR LONG-TERM ANTICOAGULANT USE: ICD-10-CM

## 2024-03-26 DIAGNOSIS — D68.9 BLOOD CLOTTING DISORDER (HCC): Primary | ICD-10-CM

## 2024-03-26 LAB — INR: 3.1 (ref 2–3)

## 2024-03-26 PROCEDURE — 93793 ANTICOAG MGMT PT WARFARIN: CPT | Performed by: FAMILY MEDICINE

## 2024-03-26 NOTE — PROGRESS NOTES
[facility] / INR 3.1, supra therapeutic. (Goal 2.5 ) TTR 80.8 %     Etiology: No changes per Pt.     PLAN: continue the current dose.    Recheck INR 2 weeks    Pt reports:    No sign of unusual bruising or bleeding.  Any missed doses: No   Medications changes: No    Contacted  De by phone  who verbalized understanding and agreement.    WARFARIN Plan per protocol: 5 mg every day

## 2024-04-22 ENCOUNTER — ANTI-COAG VISIT (OUTPATIENT)
Dept: ANTICOAGULATION | Facility: CLINIC | Age: 69
End: 2024-04-22

## 2024-04-22 DIAGNOSIS — Z51.81 MONITORING FOR LONG-TERM ANTICOAGULANT USE: ICD-10-CM

## 2024-04-22 DIAGNOSIS — D68.9 BLOOD CLOTTING DISORDER (HCC): Primary | ICD-10-CM

## 2024-04-22 DIAGNOSIS — Z79.01 LONG TERM (CURRENT) USE OF ANTICOAGULANTS: ICD-10-CM

## 2024-04-22 DIAGNOSIS — D68.59 HYPERCOAGULOPATHY (HCC): ICD-10-CM

## 2024-04-22 DIAGNOSIS — Z79.01 MONITORING FOR LONG-TERM ANTICOAGULANT USE: ICD-10-CM

## 2024-04-22 LAB — INR: 2.5 (ref 2–3)

## 2024-04-22 PROCEDURE — 93793 ANTICOAG MGMT PT WARFARIN: CPT | Performed by: FAMILY MEDICINE

## 2024-04-22 NOTE — PROGRESS NOTES
[facility] / INR 2.5,  therapeutic. (Goal 2.5 ) TTR 80.8 %     Etiology: stable.    PLAN: continue the current dose.    Recheck INR 2 weeks.    WARFARIN Plan per protocol: 5 mg every day

## 2024-05-20 ENCOUNTER — ANTI-COAG VISIT (OUTPATIENT)
Dept: ANTICOAGULATION | Facility: CLINIC | Age: 69
End: 2024-05-20

## 2024-05-20 DIAGNOSIS — Z79.01 MONITORING FOR LONG-TERM ANTICOAGULANT USE: ICD-10-CM

## 2024-05-20 DIAGNOSIS — Z51.81 MONITORING FOR LONG-TERM ANTICOAGULANT USE: ICD-10-CM

## 2024-05-20 DIAGNOSIS — D68.9 BLOOD CLOTTING DISORDER (HCC): Primary | ICD-10-CM

## 2024-05-20 DIAGNOSIS — Z79.01 LONG TERM (CURRENT) USE OF ANTICOAGULANTS: ICD-10-CM

## 2024-05-20 DIAGNOSIS — D68.59 HYPERCOAGULOPATHY (HCC): ICD-10-CM

## 2024-05-20 LAB — INR: 3 (ref 2–3)

## 2024-05-20 PROCEDURE — 93793 ANTICOAG MGMT PT WARFARIN: CPT | Performed by: FAMILY MEDICINE

## 2024-05-20 NOTE — PROGRESS NOTES
[facility] / INR 3.0,  therapeutic. (Goal 2.5 ) TTR 80.9 %     Etiology: no changes.     PLAN: continue the current dose.    Recheck INR every 2 weeks.      WARFARIN Plan per protocol: 5 mg every day

## 2024-06-24 ENCOUNTER — ANTI-COAG VISIT (OUTPATIENT)
Dept: ANTICOAGULATION | Facility: CLINIC | Age: 69
End: 2024-06-24

## 2024-06-24 DIAGNOSIS — Z79.01 LONG TERM (CURRENT) USE OF ANTICOAGULANTS: ICD-10-CM

## 2024-06-24 DIAGNOSIS — D68.9 BLOOD CLOTTING DISORDER (HCC): Primary | ICD-10-CM

## 2024-06-24 DIAGNOSIS — Z51.81 MONITORING FOR LONG-TERM ANTICOAGULANT USE: ICD-10-CM

## 2024-06-24 DIAGNOSIS — D68.59 HYPERCOAGULOPATHY (HCC): ICD-10-CM

## 2024-06-24 DIAGNOSIS — Z79.01 MONITORING FOR LONG-TERM ANTICOAGULANT USE: ICD-10-CM

## 2024-06-24 LAB — INR: 2.8 (ref 2–3)

## 2024-06-24 PROCEDURE — 93793 ANTICOAG MGMT PT WARFARIN: CPT | Performed by: FAMILY MEDICINE

## 2024-06-24 NOTE — PROGRESS NOTES
[facility] / INR 2.8,  therapeutic. (Goal 2.5 ) TTR 81.1 %     Etiology: stable. No changes.    PLAN: continue the current dose    Recheck INR every 2 weeks.    WARFARIN Plan per protocol: 5 mg every day

## 2024-07-22 ENCOUNTER — ANTI-COAG VISIT (OUTPATIENT)
Dept: ANTICOAGULATION | Facility: CLINIC | Age: 69
End: 2024-07-22

## 2024-07-22 DIAGNOSIS — Z51.81 MONITORING FOR LONG-TERM ANTICOAGULANT USE: ICD-10-CM

## 2024-07-22 DIAGNOSIS — D68.9 BLOOD CLOTTING DISORDER (HCC): Primary | ICD-10-CM

## 2024-07-22 DIAGNOSIS — D68.59 HYPERCOAGULOPATHY (HCC): ICD-10-CM

## 2024-07-22 DIAGNOSIS — Z79.01 LONG TERM (CURRENT) USE OF ANTICOAGULANTS: ICD-10-CM

## 2024-07-22 DIAGNOSIS — Z79.01 MONITORING FOR LONG-TERM ANTICOAGULANT USE: ICD-10-CM

## 2024-07-22 LAB — INR: 3 (ref 2–3)

## 2024-07-22 PROCEDURE — 93793 ANTICOAG MGMT PT WARFARIN: CPT | Performed by: FAMILY MEDICINE

## 2024-07-22 NOTE — PROGRESS NOTES
[facility] / INR 3.0,  therapeutic. (Goal 2.5 ) TTR 81.3 %     Etiology: stable.    PLAN: continue the current dose    Recheck INR 2 weeks    WARFARIN Plan per protocol: 5 mg every day

## 2024-08-26 ENCOUNTER — ANTI-COAG VISIT (OUTPATIENT)
Dept: ANTICOAGULATION | Facility: CLINIC | Age: 69
End: 2024-08-26

## 2024-08-26 DIAGNOSIS — D68.9 BLOOD CLOTTING DISORDER (HCC): Primary | ICD-10-CM

## 2024-08-26 DIAGNOSIS — Z79.01 MONITORING FOR LONG-TERM ANTICOAGULANT USE: ICD-10-CM

## 2024-08-26 DIAGNOSIS — Z51.81 MONITORING FOR LONG-TERM ANTICOAGULANT USE: ICD-10-CM

## 2024-08-26 DIAGNOSIS — D68.59 HYPERCOAGULOPATHY (HCC): ICD-10-CM

## 2024-08-26 DIAGNOSIS — Z79.01 LONG TERM (CURRENT) USE OF ANTICOAGULANTS: ICD-10-CM

## 2024-08-26 LAB — INR: 3 (ref 2–3)

## 2024-08-26 PROCEDURE — 93793 ANTICOAG MGMT PT WARFARIN: CPT | Performed by: FAMILY MEDICINE

## 2024-08-26 NOTE — PROGRESS NOTES
[facility] / INR 3.0,  therapeutic. (Goal 2.5 ) TTR 81.4 %     Etiology: stable. Pt has been checking monthly instead of every 2 weeks. Not an issue from the medical standpoint. He'll have extra strips to use if an issue comes up.    PLAN: continue the current dose.    Recheck INR 2-4 weeks.    Pt reports:    No sign of unusual bruising or bleeding.  Any missed doses: No   Medications changes: No    WARFARIN Plan per protocol: 5 mg every day

## 2024-09-23 ENCOUNTER — ANTI-COAG VISIT (OUTPATIENT)
Dept: ANTICOAGULATION | Facility: CLINIC | Age: 69
End: 2024-09-23

## 2024-09-23 DIAGNOSIS — D68.59 HYPERCOAGULOPATHY (HCC): ICD-10-CM

## 2024-09-23 DIAGNOSIS — Z79.01 LONG TERM (CURRENT) USE OF ANTICOAGULANTS: ICD-10-CM

## 2024-09-23 DIAGNOSIS — Z51.81 MONITORING FOR LONG-TERM ANTICOAGULANT USE: ICD-10-CM

## 2024-09-23 DIAGNOSIS — Z79.01 MONITORING FOR LONG-TERM ANTICOAGULANT USE: ICD-10-CM

## 2024-09-23 DIAGNOSIS — D68.9 BLOOD CLOTTING DISORDER (HCC): Primary | ICD-10-CM

## 2024-09-23 LAB — INR: 2.4 (ref 2–3)

## 2024-09-23 PROCEDURE — 93793 ANTICOAG MGMT PT WARFARIN: CPT | Performed by: FAMILY MEDICINE

## 2024-09-23 NOTE — PROGRESS NOTES
[facility] / INR 2.4,  therapeutic. (Goal 2.5 ) TTR 81.6 %     Etiology: stable. No changes    PLAN: continue the current dose.    Recheck INR 2 weeks.    WARFARIN Plan per protocol: 5 mg every day

## 2024-10-11 DIAGNOSIS — E78.5 DYSLIPIDEMIA: ICD-10-CM

## 2024-10-11 NOTE — TELEPHONE ENCOUNTER
Patient called to request a refill on below medication. Andre[facility]ens on file verified.     Medication Quantity Refills Start End   simvastatin 20 MG Oral Tab 90 tablet 3 10/6/2023 --   Sig:   Take 1 tablet (20 mg total) by mouth nightly.     Route:   Oral     Order #:   [id number]

## 2024-10-14 RX ORDER — SIMVASTATIN 20 MG
20 TABLET ORAL NIGHTLY
Qty: 90 TABLET | Refills: 0 | Status: SHIPPED | OUTPATIENT
Start: 2024-10-14

## 2024-10-14 NOTE — TELEPHONE ENCOUNTER
Please review; protocol failed/No Protocol    No Active/Future labs pended     Requested Prescriptions   Pending Prescriptions Disp Refills    simvastatin 20 MG Oral Tab 90 tablet 3     Sig: Take 1 tablet (20 mg total) by mouth nightly.       Cholesterol Medication Protocol Failed - 10/14/2024  3:51 PM        Failed - ALT < 80     Lab Results   Component Value Date    ALT 26 10/06/2023             Failed - ALT resulted within past year        Failed - Lipid panel within past 12 months     Lab Results   Component Value Date    CHOLEST 144 10/06/2023    TRIG 76 10/06/2023    HDL 50 10/06/2023    LDL 79 10/06/2023    VLDL 12 10/06/2023    TCHDLRATIO 3.4 11/30/2016    NONHDLC 94 10/06/2023             Passed - In person appointment or virtual visit in the past 12 mos or appointment in next 3 mos     Recent Outpatient Visits              1 year ago Adult general medical exam    [facility] Jet Gibson MD    Office Visit    1 year ago Chalazion left lower eyelid    [facility] Rayray Myles MD    Office Visit    2 years ago Adult general medical exam    [facility] Jet Gibson [facility]    Office Visit    3 years ago Essential hypertension    [facility]    Nurse Only    3 years ago Adult general medical exam    [facility] Jet Gibson MD    Office Visit          Future Appointments         Provider Department Appt Notes    In 4 days Jet Gibson MD [facility]                        Future Appointments         Provider Department Appt Notes    In 4 days Jet Gibson MD [facility]           Recent Outpatient Visits              1 year ago Adult general medical exam    [facility], Jet Granados MD    Office Visit    1 year ago Chalazion left lower eyelid    [facility], [place] Rayray Myles MD    Office Visit    2 years ago Adult general medical exam    [facility], [facility], Jet Granados MD    Office Visit    3 years ago Essential hypertension    [facility], [place]    Nurse Only    3 years ago Adult general medical exam    [facility], [facility], Jet Granados MD    Office Visit

## 2024-10-18 ENCOUNTER — LAB ENCOUNTER (OUTPATIENT)
Dept: LAB | Facility: HOSPITAL | Age: 69
End: 2024-10-18
Attending: INTERNAL MEDICINE
Payer: COMMERCIAL

## 2024-10-18 ENCOUNTER — ANTI-COAG VISIT (OUTPATIENT)
Dept: ANTICOAGULATION | Facility: CLINIC | Age: 69
End: 2024-10-18

## 2024-10-18 ENCOUNTER — TELEPHONE (OUTPATIENT)
Dept: ANTICOAGULATION | Facility: CLINIC | Age: 69
End: 2024-10-18

## 2024-10-18 ENCOUNTER — OFFICE VISIT (OUTPATIENT)
Facility: CLINIC | Age: 69
End: 2024-10-18

## 2024-10-18 VITALS
HEART RATE: 67 BPM | BODY MASS INDEX: 28.84 KG/M2 | WEIGHT: 206 LBS | HEIGHT: 71 IN | OXYGEN SATURATION: 97 % | SYSTOLIC BLOOD PRESSURE: 132 MMHG | DIASTOLIC BLOOD PRESSURE: 74 MMHG

## 2024-10-18 DIAGNOSIS — Z79.01 LONG TERM (CURRENT) USE OF ANTICOAGULANTS: ICD-10-CM

## 2024-10-18 DIAGNOSIS — C44.90 SKIN CANCER: ICD-10-CM

## 2024-10-18 DIAGNOSIS — Z00.00 ADULT GENERAL MEDICAL EXAM: ICD-10-CM

## 2024-10-18 DIAGNOSIS — Z79.01 MONITORING FOR LONG-TERM ANTICOAGULANT USE: ICD-10-CM

## 2024-10-18 DIAGNOSIS — Z51.81 MONITORING FOR LONG-TERM ANTICOAGULANT USE: ICD-10-CM

## 2024-10-18 DIAGNOSIS — I87.2 VENOUS INSUFFICIENCY (CHRONIC) (PERIPHERAL): ICD-10-CM

## 2024-10-18 DIAGNOSIS — D68.59 HYPERCOAGULOPATHY (HCC): Primary | ICD-10-CM

## 2024-10-18 DIAGNOSIS — D68.59 HYPERCOAGULOPATHY (HCC): ICD-10-CM

## 2024-10-18 DIAGNOSIS — D68.9 BLOOD CLOTTING DISORDER (HCC): Primary | ICD-10-CM

## 2024-10-18 DIAGNOSIS — Z00.00 ADULT GENERAL MEDICAL EXAM: Primary | ICD-10-CM

## 2024-10-18 DIAGNOSIS — H61.23 BILATERAL IMPACTED CERUMEN: ICD-10-CM

## 2024-10-18 DIAGNOSIS — E78.5 DYSLIPIDEMIA: ICD-10-CM

## 2024-10-18 DIAGNOSIS — Z12.11 SCREEN FOR COLON CANCER: ICD-10-CM

## 2024-10-18 LAB
ALBUMIN SERPL-MCNC: 4.4 G/DL (ref 3.2–4.8)
ALBUMIN/GLOB SERPL: 1.9 {RATIO} (ref 1–2)
ALP LIVER SERPL-CCNC: 64 U/L
ALT SERPL-CCNC: 14 U/L
ANION GAP SERPL CALC-SCNC: 4 MMOL/L (ref 0–18)
AST SERPL-CCNC: 22 U/L (ref ?–34)
BILIRUB SERPL-MCNC: 0.7 MG/DL (ref 0.2–1.1)
BUN BLD-MCNC: 9 MG/DL (ref 9–23)
BUN/CREAT SERPL: 8.7 (ref 10–20)
CALCIUM BLD-MCNC: 9.1 MG/DL (ref 8.7–10.4)
CHLORIDE SERPL-SCNC: 111 MMOL/L (ref 98–112)
CHOLEST SERPL-MCNC: 145 MG/DL (ref ?–200)
CO2 SERPL-SCNC: 28 MMOL/L (ref 21–32)
COMPLEXED PSA SERPL-MCNC: 1.58 NG/ML (ref ?–4)
CREAT BLD-MCNC: 1.03 MG/DL
DEPRECATED RDW RBC AUTO: 48 FL (ref 35.1–46.3)
EGFRCR SERPLBLD CKD-EPI 2021: 78 ML/MIN/1.73M2 (ref 60–?)
ERYTHROCYTE [DISTWIDTH] IN BLOOD BY AUTOMATED COUNT: 13 % (ref 11–15)
EST. AVERAGE GLUCOSE BLD GHB EST-MCNC: 120 MG/DL (ref 68–126)
FASTING PATIENT LIPID ANSWER: YES
FASTING STATUS PATIENT QL REPORTED: YES
GLOBULIN PLAS-MCNC: 2.3 G/DL (ref 2–3.5)
GLUCOSE BLD-MCNC: 103 MG/DL (ref 70–99)
HBA1C MFR BLD: 5.8 % (ref ?–5.7)
HCT VFR BLD AUTO: 41.2 %
HDLC SERPL-MCNC: 51 MG/DL (ref 40–59)
HGB BLD-MCNC: 14.3 G/DL
INR: 2.4 (ref 0.8–1.2)
LDLC SERPL CALC-MCNC: 80 MG/DL (ref ?–100)
MCH RBC QN AUTO: 34.6 PG (ref 26–34)
MCHC RBC AUTO-ENTMCNC: 34.7 G/DL (ref 31–37)
MCV RBC AUTO: 99.8 FL
NONHDLC SERPL-MCNC: 94 MG/DL (ref ?–130)
OSMOLALITY SERPL CALC.SUM OF ELEC: 295 MOSM/KG (ref 275–295)
PLATELET # BLD AUTO: 187 10(3)UL (ref 150–450)
POTASSIUM SERPL-SCNC: 4.6 MMOL/L (ref 3.5–5.1)
PROT SERPL-MCNC: 6.7 G/DL (ref 5.7–8.2)
RBC # BLD AUTO: 4.13 X10(6)UL
SODIUM SERPL-SCNC: 143 MMOL/L (ref 136–145)
TRIGL SERPL-MCNC: 71 MG/DL (ref 30–149)
TSI SER-ACNC: 1.3 MIU/ML (ref 0.55–4.78)
VLDLC SERPL CALC-MCNC: 11 MG/DL (ref 0–30)
WBC # BLD AUTO: 5.5 X10(3) UL (ref 4–11)

## 2024-10-18 PROCEDURE — 36415 COLL VENOUS BLD VENIPUNCTURE: CPT

## 2024-10-18 PROCEDURE — 85027 COMPLETE CBC AUTOMATED: CPT

## 2024-10-18 PROCEDURE — 84443 ASSAY THYROID STIM HORMONE: CPT

## 2024-10-18 PROCEDURE — 83036 HEMOGLOBIN GLYCOSYLATED A1C: CPT

## 2024-10-18 PROCEDURE — 80053 COMPREHEN METABOLIC PANEL: CPT

## 2024-10-18 PROCEDURE — 93793 ANTICOAG MGMT PT WARFARIN: CPT | Performed by: FAMILY MEDICINE

## 2024-10-18 PROCEDURE — 80061 LIPID PANEL: CPT

## 2024-10-18 RX ORDER — AMLODIPINE BESYLATE 10 MG/1
10 TABLET ORAL DAILY
Qty: 90 TABLET | Refills: 3 | Status: SHIPPED | OUTPATIENT
Start: 2024-10-18

## 2024-10-18 RX ORDER — SIMVASTATIN 20 MG
20 TABLET ORAL NIGHTLY
Qty: 90 TABLET | Refills: 3 | Status: SHIPPED | OUTPATIENT
Start: 2024-10-18

## 2024-10-18 RX ORDER — WARFARIN SODIUM 5 MG/1
5 TABLET ORAL NIGHTLY
Qty: 90 TABLET | Refills: 3 | Status: SHIPPED | OUTPATIENT
Start: 2024-10-18

## 2024-10-18 NOTE — TELEPHONE ENCOUNTER
Anticoag referral expires soon. Order pended. Please sign, thank you.      Dx: Hypercoagulopathy (HCC) (D68.59)  Monitoring for long-term anticoagulant use (Z51.81,Z79.01

## 2024-10-18 NOTE — PROGRESS NOTES
TTR 81.7%    INR result received from [facility] home monitoring.     Per protocol, continue current dose and recheck INR in 4 weeks.     5 mg every day

## 2024-10-18 NOTE — PROGRESS NOTES
De Patel is a 69 year old male.  Chief Complaint   Patient presents with    Physical     HPI:   69-year-old gentleman here for physical.   He has few concerns.  1 concern was swelling of the legs.  Other concern was clogged ears.    Apart from that, he has no complaints.  Denies any chest pain, shortness of breath, abdominal, nausea vomiting or bleeding.  Do home check inr         Past medical history hypertension, dyslipidemia, hypercoagulable state     Past surgical history none     He is not allergic to any medication.     He does not smoke regularly.  He smokes cigars occasionally.  He denies any recreational drug abuse or alcohol abuse.     Family history-he denies any prostate cancer or colon cancer in the family.     He is , he has kids.      Current Outpatient Medications   Medication Sig Dispense Refill    warfarin 5 MG Oral Tab Take 1 tablet (5 mg total) by mouth nightly. 90 tablet 3    amLODIPine 10 MG Oral Tab Take 1 tablet (10 mg total) by mouth daily. 90 tablet 3    simvastatin 20 MG Oral Tab Take 1 tablet (20 mg total) by mouth nightly. 90 tablet 3    PT/INR Test In Vitro Strip Use strips monthly to check PT/INR as direceted 12 strip 1      Past Medical History:    Adenomatous colon polyp    2/1/2015    Antithrombin 3 deficiency (HCC)    coumadin/compression leggings    Deep vein thrombosis (HCC)    R leg    High blood pressure    High cholesterol      Past Surgical History:   Procedure Laterality Date    Colonoscopy  2014    no polyps last time but had few prior.     Colonoscopy N/A 4/19/2019    Procedure: COLONOSCOPY;  Surgeon: Ronald Bacon MD;  Location: [facility] ENDOSCOPY    Colonoscopy      Colonoscopy N/A 5/26/2022    Procedure: COLONOSCOPY;  Surgeon: Ronald Bacon MD;  Location: [facility] ENDOSCOPY      Social History:  Social History     Socioeconomic History    Marital status:    Tobacco Use    Smoking status: Some Days     Current packs/day: 0.00     Types: Cigars,  Cigarettes    Smokeless tobacco: Never    Tobacco comments:     cigars - daily    Vaping Use    Vaping status: Never Used   Substance and Sexual Activity    Alcohol use: Yes     Alcohol/week: 21.0 standard drinks of alcohol     Types: 21 Cans of beer per week     Comment: 3 beers nightly    Drug use: No    Sexual activity: Yes   Other Topics Concern    Caffeine Concern Yes     Comment: coffee, 1 cup      Family History   Problem Relation Age of Onset    Heart Disorder Mother         a fibrillation    Other (septicemia) Mother         septicemia    Hypertension Father     Heart Disorder Father     Diabetes Sister         per NG: in 1 juvenile 43-57; x3 sisters    Glaucoma Neg     Macular degeneration Neg       Allergies[1]     REVIEW OF SYSTEMS:   Review of Systems   Review of Systems   Constitutional: Negative for activity change, appetite change and fever.   HENT: Negative for congestion and voice change.    Respiratory: Negative for cough and shortness of breath.    Cardiovascular: Negative for chest pain.   Gastrointestinal: Negative for abdominal distention, abdominal pain and vomiting.   Genitourinary: Negative for hematuria.   Skin: Negative for wound.   Psychiatric/Behavioral: Negative for behavioral problems.   Wt Readings from Last 5 Encounters:   10/18/24 206 lb (93.4 kg)   10/06/23 204 lb (92.5 kg)   08/04/22 196 lb (88.9 kg)   05/26/22 216 lb (98 kg)   07/29/21 216 lb (98 kg)     Body mass index is 28.73 kg/m².      EXAM:   /74   Pulse 67   Ht 5' 11\" (1.803 m)   Wt 206 lb (93.4 kg)   SpO2 97%   BMI 28.73 kg/m²   Physical Exam   Constitutional:       Appearance: Normal appearance.   HENT:      Head: Normocephalic.   Eyes:      Conjunctiva/sclera: Conjunctivae normal.   Cardiovascular:      Rate and Rhythm: Normal rate and regular rhythm.      Heart sounds: Normal heart sounds. No murmur heard.  Pulmonary:      Effort: Pulmonary effort is normal.      Breath sounds: Normal breath sounds. No  rhonchi or rales.   Abdominal:      General: Bowel sounds are normal.      Palpations: Abdomen is soft.      Tenderness: There is no abdominal tenderness.   Musculoskeletal:      Cervical back: Neck supple.   Edema present bilaterally  Skin:     General: Skin is warm and dry.   Neurological:      General: No focal deficit present.      Mental Status: He is alert and oriented to person, place, and time. Mental status is at baseline.   Psychiatric:         Mood and Affect: Mood normal.         Behavior: Behavior normal.   Bilateral cerumen impaction    ASSESSMENT AND PLAN:   1. Adult general medical exam  Eat healthy.  Low-salt diet.  Bleeding precautions discussed.  Colonoscopy referral given.  PSA ordered.  Reviewed vaccines.  - amLODIPine 10 MG Oral Tab; Take 1 tablet (10 mg total) by mouth daily.  Dispense: 90 tablet; Refill: 3  - CBC, Platelet; No Differential; Future  - Comp Metabolic Panel (14); Future  - Hemoglobin A1C; Future  - Lipid Panel; Future  - TSH W Reflex To Free T4; Future  - PSA Total, Screen; Future    2. Dyslipidemia  Continue statins.  Check lipid profile and LFTs.  - simvastatin 20 MG Oral Tab; Take 1 tablet (20 mg total) by mouth nightly.  Dispense: 90 tablet; Refill: 3    3. Hypercoagulopathy (HCC)  Continue to monitor INR.  Bleeding precautions discussed.  - PT/INR Test In Vitro Strip; Use strips monthly to check PT/INR as direceted  Dispense: 12 strip; Refill: 1    4. Screen for colon cancer  Colonoscopy referral given  - GASTRO - INTERNAL    5. Bilateral impacted cerumen  I have removed the cerumen with the mechanical instrumentation and also with irrigation.  Post removal, tympanic membrane was clearly visible.  Patient tolerated procedure well.  No complications.  I have encouraged him to make appointment to see ENT for cerumen evaluation in the future.    6. Skin cancer  Continue to follow-up with dermatology.  Referral given.  - Derm Referral - In Network    7. Venous insufficiency  (chronic) (peripheral)  Elevate legs up.  Use compression stockings.  Low-salt diet.  If no improvement, can consider low-dose of diuretics.    Plan: As above.      The patient indicates understanding of these issues and agrees to the plan.  No follow-ups on file.    This note was prepared using Dragon Medical voice recognition dictation software. As a result errors may occur. When identified these errors have been corrected. While every attempt is made to correct errors during dictation discrepancies may still exist.         [1] No Known Allergies

## 2024-11-25 ENCOUNTER — ANTI-COAG VISIT (OUTPATIENT)
Dept: ANTICOAGULATION | Facility: CLINIC | Age: 69
End: 2024-11-25

## 2024-11-25 DIAGNOSIS — D68.9 BLOOD CLOTTING DISORDER (HCC): Primary | ICD-10-CM

## 2024-11-25 DIAGNOSIS — Z51.81 MONITORING FOR LONG-TERM ANTICOAGULANT USE: ICD-10-CM

## 2024-11-25 DIAGNOSIS — D68.59 HYPERCOAGULOPATHY (HCC): ICD-10-CM

## 2024-11-25 DIAGNOSIS — Z79.01 LONG TERM (CURRENT) USE OF ANTICOAGULANTS: ICD-10-CM

## 2024-11-25 DIAGNOSIS — Z79.01 MONITORING FOR LONG-TERM ANTICOAGULANT USE: ICD-10-CM

## 2024-11-25 LAB — INR: 2.8 (ref 2–3)

## 2024-11-25 PROCEDURE — 93793 ANTICOAG MGMT PT WARFARIN: CPT | Performed by: FAMILY MEDICINE

## 2024-11-25 NOTE — PROGRESS NOTES
[facility] / INR 2.8,  therapeutic. (Goal 2.5 ) TTR 81.9 %     Etiology: stable.    PLAN: continue the current dose.    Recheck INR 2 weeks.    WARFARIN Plan per protocol: 5 mg every day

## 2024-12-05 ENCOUNTER — LAB REQUISITION (OUTPATIENT)
Dept: LAB | Facility: HOSPITAL | Age: 69
End: 2024-12-05
Payer: COMMERCIAL

## 2024-12-05 ENCOUNTER — APPOINTMENT (OUTPATIENT)
Dept: URBAN - METROPOLITAN AREA CLINIC 244 | Age: 69
Setting detail: DERMATOLOGY
End: 2024-12-05

## 2024-12-05 DIAGNOSIS — L81.4 OTHER MELANIN HYPERPIGMENTATION: ICD-10-CM

## 2024-12-05 DIAGNOSIS — L82.1 OTHER SEBORRHEIC KERATOSIS: ICD-10-CM

## 2024-12-05 DIAGNOSIS — D22 MELANOCYTIC NEVI: ICD-10-CM

## 2024-12-05 DIAGNOSIS — D48.5 NEOPLASM OF UNCERTAIN BEHAVIOR OF SKIN: ICD-10-CM

## 2024-12-05 DIAGNOSIS — Z86.007 PERSONAL HISTORY OF IN-SITU NEOPLASM OF SKIN: ICD-10-CM

## 2024-12-05 PROBLEM — D22.9 MELANOCYTIC NEVI, UNSPECIFIED: Status: ACTIVE | Noted: 2024-12-05

## 2024-12-05 PROCEDURE — OTHER COUNSELING: OTHER

## 2024-12-05 PROCEDURE — 88305 TISSUE EXAM BY PATHOLOGIST: CPT | Performed by: STUDENT IN AN ORGANIZED HEALTH CARE EDUCATION/TRAINING PROGRAM

## 2024-12-05 PROCEDURE — OTHER PRESCRIPTION MEDICATION MANAGEMENT: OTHER

## 2024-12-05 PROCEDURE — OTHER BIOPSY BY SHAVE METHOD: OTHER

## 2024-12-05 PROCEDURE — OTHER MIPS QUALITY: OTHER

## 2024-12-05 PROCEDURE — 99213 OFFICE O/P EST LOW 20 MIN: CPT | Mod: 25

## 2024-12-05 PROCEDURE — 11102 TANGNTL BX SKIN SINGLE LES: CPT

## 2024-12-05 ASSESSMENT — LOCATION DETAILED DESCRIPTION DERM: LOCATION DETAILED: RIGHT LATERAL MID-DORSAL PENILE SHAFT

## 2024-12-05 ASSESSMENT — LOCATION SIMPLE DESCRIPTION DERM: LOCATION SIMPLE: DORSAL PENIS

## 2024-12-05 ASSESSMENT — LOCATION ZONE DERM: LOCATION ZONE: PENIS

## 2024-12-05 NOTE — PROCEDURE: BIOPSY BY SHAVE METHOD
Type Of Destruction Used: Curettage
Bill For Surgical Tray: no
Additional Anesthesia Volume In Cc (Will Not Render If 0): 0
Detail Level: Detailed
Hemostasis: Drysol
Curettage Text: The wound bed was treated with curettage after the biopsy was performed.
Information: Selecting Yes will display possible errors in your note based on the variables you have selected. This validation is only offered as a suggestion for you. PLEASE NOTE THAT THE VALIDATION TEXT WILL BE REMOVED WHEN YOU FINALIZE YOUR NOTE. IF YOU WANT TO FAX A PRELIMINARY NOTE YOU WILL NEED TO TOGGLE THIS TO 'NO' IF YOU DO NOT WANT IT IN YOUR FAXED NOTE.
Billing Type: Third-Party Bill
Anesthesia Type: 0.5% lidocaine with 1:100,000 epinephrine and a 1:10 solution of 8.4% sodium bicarbonate
Notification Instructions: Patient will be notified of biopsy results. However, patient instructed to call the office if not contacted within 2 weeks.
Dressing: bandage
Anesthesia Volume In Cc: 0.5
Biopsy Type: H and E
Lab: -[id number]
Consent: Written consent was obtained and risks were reviewed including but not limited to scarring, infection, bleeding, scabbing, incomplete removal, nerve damage and allergy to anesthesia.
Post-Care Instructions: I reviewed with the patient in detail post-care instructions. Patient is to keep the biopsy site dry overnight, and then apply bacitracin twice daily until healed. Patient may apply hydrogen peroxide soaks to remove any crusting.
Was A Bandage Applied: Yes
Silver Nitrate Text: The wound bed was treated with silver nitrate after the biopsy was performed.
Cryotherapy Text: The wound bed was treated with cryotherapy after the biopsy was performed.
Biopsy Method: double edge Personna blade
Electrodesiccation Text: The wound bed was treated with electrodesiccation after the biopsy was performed.
Depth Of Biopsy: dermis
Electrodesiccation And Curettage Text: The wound bed was treated with electrodesiccation and curettage after the biopsy was performed.
Wound Care: Petrolatum

## 2024-12-09 ENCOUNTER — MED REC SCAN ONLY (OUTPATIENT)
Dept: INTERNAL MEDICINE CLINIC | Facility: CLINIC | Age: 69
End: 2024-12-09

## 2024-12-18 ENCOUNTER — TELEPHONE (OUTPATIENT)
Dept: ANTICOAGULATION | Facility: CLINIC | Age: 69
End: 2024-12-18

## 2024-12-18 DIAGNOSIS — Z79.01 MONITORING FOR LONG-TERM ANTICOAGULANT USE: ICD-10-CM

## 2024-12-18 DIAGNOSIS — Z51.81 MONITORING FOR LONG-TERM ANTICOAGULANT USE: ICD-10-CM

## 2024-12-18 DIAGNOSIS — D68.9 BLOOD CLOTTING DISORDER (HCC): Primary | ICD-10-CM

## 2024-12-18 DIAGNOSIS — D68.51 APC RESISTANCE (HCC): ICD-10-CM

## 2024-12-18 DIAGNOSIS — D68.59 HYPERCOAGULOPATHY (HCC): ICD-10-CM

## 2024-12-18 NOTE — TELEPHONE ENCOUNTER
Faxed request received from [employer]- prior authorization referral request for Co[employer] test strips.     DME referral has been pended.   Faxed request has been placed in your mailbox- states to include the sheet with your faxed response.

## 2024-12-20 NOTE — TELEPHONE ENCOUNTER
Tatiana Chaudhary asked that I get this to you as he is out of town. I placed it in your mailbox.   Let me know if you have any questions.

## 2024-12-23 ENCOUNTER — ANTI-COAG VISIT (OUTPATIENT)
Dept: ANTICOAGULATION | Facility: CLINIC | Age: 69
End: 2024-12-23

## 2024-12-23 DIAGNOSIS — D68.59 HYPERCOAGULOPATHY (HCC): ICD-10-CM

## 2024-12-23 DIAGNOSIS — D68.9 BLOOD CLOTTING DISORDER (HCC): Primary | ICD-10-CM

## 2024-12-23 DIAGNOSIS — Z51.81 MONITORING FOR LONG-TERM ANTICOAGULANT USE: ICD-10-CM

## 2024-12-23 DIAGNOSIS — Z79.01 MONITORING FOR LONG-TERM ANTICOAGULANT USE: ICD-10-CM

## 2024-12-23 DIAGNOSIS — Z79.01 LONG TERM (CURRENT) USE OF ANTICOAGULANTS: ICD-10-CM

## 2024-12-23 LAB — INR: 2.5 (ref 2–3)

## 2024-12-23 PROCEDURE — 93793 ANTICOAG MGMT PT WARFARIN: CPT | Performed by: FAMILY MEDICINE

## 2024-12-23 NOTE — PROGRESS NOTES
[facility] / INR 2.5,  therapeutic. (Goal 2.5 ) TTR 82.0 %     Etiology: stable.    PLAN: continue the current dose.    Recheck INR 2 weeks    WARFARIN Plan per protocol: 5 mg every day

## 2024-12-30 NOTE — TELEPHONE ENCOUNTER
Letter    PATIENT NAME:  NOBLE NUÑEZ/ [phone] (home)/ There is no work phone number on file.  PATIENT :  1955  REFERRAL ID #:  [id number]  REFERRAL STATUS:  Authorized [1]  REVIEW REFERRAL NOTES FOR MORE INFORMATION:  DATE AUTHORIZED:  2024 // EXPIRATION DATE: 2025   REFERRED BY:  ANTONIO MARSH MD (TEL: [phone])  REFERRED TO: No referral provider, MD (TEL: No Referred to Provider on Referral)     No vendor specified on referral. / No vendor phone number known.  Abdulkadir Ruyb  REFERRED FOR:    Diagnoses:    D68.9 (ICD-10-CM) - 286.9 (ICD-9-CM) - Blood clotting disorder (HCC)    D68.59 (ICD-10-CM) - 289.81 (ICD-9-CM) - Hypercoagulopathy (HCC)    Z51.81, Z79.01 (ICD-10-CM) - V58.61 (ICD-9-CM) - Monitoring for long-term anticoagulant use    D68.51 (ICD-10-CM) - 289.81 (ICD-9-CM) - APC resistance (HCC)  Procedures:     [id number] - DME - EXTERNAL    NUMBER OF VISITS AUTH: 1

## 2025-01-16 DIAGNOSIS — E78.5 DYSLIPIDEMIA: ICD-10-CM

## 2025-01-16 RX ORDER — SIMVASTATIN 20 MG
20 TABLET ORAL NIGHTLY
Qty: 90 TABLET | Refills: 3 | OUTPATIENT
Start: 2025-01-16

## 2025-01-16 NOTE — TELEPHONE ENCOUNTER
Patient is requesting a refill on his simvastatin medication. Pharmacy: [facility] (listed)     Current Outpatient Medications    simvastatin 20 MG Oral Tab Take 1 tablet (20 mg total) by mouth nightly. 90 tablet 3

## 2025-01-16 NOTE — TELEPHONE ENCOUNTER
Contacted patients [facility]'s pharmacy notified Simvastatin was sent on 10/18/2024 with 3 refills said will get ready for patient for  today.

## 2025-01-20 ENCOUNTER — ANTI-COAG VISIT (OUTPATIENT)
Dept: ANTICOAGULATION | Facility: CLINIC | Age: 70
End: 2025-01-20

## 2025-01-20 DIAGNOSIS — Z79.01 LONG TERM (CURRENT) USE OF ANTICOAGULANTS: ICD-10-CM

## 2025-01-20 DIAGNOSIS — Z51.81 MONITORING FOR LONG-TERM ANTICOAGULANT USE: ICD-10-CM

## 2025-01-20 DIAGNOSIS — D68.9 BLOOD CLOTTING DISORDER (HCC): Primary | ICD-10-CM

## 2025-01-20 DIAGNOSIS — Z79.01 MONITORING FOR LONG-TERM ANTICOAGULANT USE: ICD-10-CM

## 2025-01-20 DIAGNOSIS — D68.59 HYPERCOAGULOPATHY (HCC): ICD-10-CM

## 2025-01-20 LAB — INR: 2.3 (ref 2–3)

## 2025-01-20 NOTE — PROGRESS NOTES
[facility] / INR 2.3,  therapeutic. (Goal 2.5 ) TTR 82.2 %     Etiology: stable    PLAN: continue the current dose.    Recheck INR 4 weeks    WARFARIN Plan per protocol: 5 mg every day

## 2025-02-25 ENCOUNTER — ANTI-COAG VISIT (OUTPATIENT)
Dept: ANTICOAGULATION | Facility: CLINIC | Age: 70
End: 2025-02-25

## 2025-02-25 DIAGNOSIS — Z51.81 MONITORING FOR LONG-TERM ANTICOAGULANT USE: ICD-10-CM

## 2025-02-25 DIAGNOSIS — Z79.01 MONITORING FOR LONG-TERM ANTICOAGULANT USE: ICD-10-CM

## 2025-02-25 DIAGNOSIS — D68.59 HYPERCOAGULOPATHY (HCC): ICD-10-CM

## 2025-02-25 DIAGNOSIS — D68.9 BLOOD CLOTTING DISORDER (HCC): Primary | ICD-10-CM

## 2025-02-25 DIAGNOSIS — Z79.01 LONG TERM (CURRENT) USE OF ANTICOAGULANTS: ICD-10-CM

## 2025-02-25 LAB — INR: 2.4 (ref 0.8–1.2)

## 2025-02-25 PROCEDURE — 93793 ANTICOAG MGMT PT WARFARIN: CPT | Performed by: FAMILY MEDICINE

## 2025-02-25 NOTE — PROGRESS NOTES
TTR 82.3%     INR result received from [facility] home monitoring.      Per protocol, continue current dose and recheck INR in 4 weeks.     5 mg every day

## 2025-02-28 ENCOUNTER — TELEPHONE (OUTPATIENT)
Facility: CLINIC | Age: 70
End: 2025-02-28

## 2025-02-28 NOTE — TELEPHONE ENCOUNTER
Patient outreach message received:    3 year colonoscopy recall entered into patient outreach in [facility]. Next colonoscopy will be due 5/26/2025.     Recall reminder letter mailed out to patient.

## 2025-03-22 NOTE — TELEPHONE ENCOUNTER
Contacted [facility] spoke with rep Feng informed coagulation test strips have been approved and he is going to send the supplies to the patient and supplies are covered 100%.  Transferred to order processing department spoke with rep Sal Breaux, authorization info no

## 2025-03-24 ENCOUNTER — ANTI-COAG VISIT (OUTPATIENT)
Dept: ANTICOAGULATION | Facility: CLINIC | Age: 70
End: 2025-03-24

## 2025-03-24 DIAGNOSIS — Z51.81 MONITORING FOR LONG-TERM ANTICOAGULANT USE: ICD-10-CM

## 2025-03-24 DIAGNOSIS — D68.59 HYPERCOAGULOPATHY (HCC): ICD-10-CM

## 2025-03-24 DIAGNOSIS — Z79.01 MONITORING FOR LONG-TERM ANTICOAGULANT USE: ICD-10-CM

## 2025-03-24 DIAGNOSIS — D68.9 BLOOD CLOTTING DISORDER (HCC): Primary | ICD-10-CM

## 2025-03-24 DIAGNOSIS — Z79.01 LONG TERM (CURRENT) USE OF ANTICOAGULANTS: ICD-10-CM

## 2025-03-24 LAB — INR: 3.3 (ref 2–3)

## 2025-03-24 PROCEDURE — 93793 ANTICOAG MGMT PT WARFARIN: CPT | Performed by: FAMILY MEDICINE

## 2025-03-24 NOTE — PROGRESS NOTES
[facility] / INR 3.3, supra therapeutic. (Goal 2.5 ) TTR 82.2 %     Etiology: no changes. No illness. Meds same    PLAN: reduced Sunday to 2.5mg one time.    Recheck INR 2 weeks. Don't skip it this month.PLZ    Pt reports:    No sign of unusual bruising or bleeding.  Any missed doses: No   Medications changes: No  Diet changes:  No    Contacted  De by phone  who verbalized understanding and agreement.    WARFARIN Plan per protocol: 5 mg every day

## 2025-04-21 ENCOUNTER — ANTI-COAG VISIT (OUTPATIENT)
Dept: ANTICOAGULATION | Facility: CLINIC | Age: 70
End: 2025-04-21

## 2025-04-21 DIAGNOSIS — D68.9 BLOOD CLOTTING DISORDER (HCC): Primary | ICD-10-CM

## 2025-04-21 DIAGNOSIS — D68.59 HYPERCOAGULOPATHY (HCC): ICD-10-CM

## 2025-04-21 DIAGNOSIS — Z79.01 LONG TERM (CURRENT) USE OF ANTICOAGULANTS: ICD-10-CM

## 2025-04-21 DIAGNOSIS — Z79.01 MONITORING FOR LONG-TERM ANTICOAGULANT USE: ICD-10-CM

## 2025-04-21 DIAGNOSIS — Z51.81 MONITORING FOR LONG-TERM ANTICOAGULANT USE: ICD-10-CM

## 2025-04-21 LAB — INR: 2.3 (ref 2–3)

## 2025-04-21 PROCEDURE — 93793 ANTICOAG MGMT PT WARFARIN: CPT | Performed by: FAMILY MEDICINE

## 2025-04-21 NOTE — PROGRESS NOTES
[facility] / INR 2.3,  therapeutic. (Goal 2.5 ) TTR 82.1 %     Etiology: stable. No changes    PLAN: continue the current dose.    Recheck INR 2 weeks.    WARFARIN Plan per protocol: 5 mg every day

## 2025-04-24 ENCOUNTER — TELEPHONE (OUTPATIENT)
Facility: CLINIC | Age: 70
End: 2025-04-24

## 2025-04-24 DIAGNOSIS — Z12.11 COLON CANCER SCREENING: Primary | ICD-10-CM

## 2025-04-24 DIAGNOSIS — Z86.0100 HISTORY OF COLON POLYPS: ICD-10-CM

## 2025-04-24 NOTE — TELEPHONE ENCOUNTER
Dr. Bacon    Colon recall.     Please provide orders if ok to schedule directly.     Reviewed allergies, pharmacy, medications and health history.     Last Procedure, Date, MD:  Colonoscopy Dr. Bacon 5/26/2022  Last Diagnosis:  colon polyps x3, diverticulosis, internal hemorrhoids  Recalled (mth/yrs): 3 years  Sedation Used Previously:  MAC  Last Prep Used (if known):  Trilyte  Quality Of Prep (if known): good  Anticoagulants:  warfarin-history of DVT in right leg and Antithrombin 3 deficiency  Diuretics: no  Diabetic Medication (oral/insulin): no  Weight loss Medication: no  Iron/Herbal/Multivitamin Supplements: no  Marijuana/Vaping/CBD: no  Height/Weight: 5'10\" 206 lbs  BMI:  Hx of Cardiac &/or CVA Issues (MI/Stroke): no  Devices Pacemaker/Defibrillator/Stents: no  Respiratory Issues/Oxygen Use/CINTHIA/COPD: no  Issues w/ Anesthesia: no    Symptoms (Y/N): no  Symptoms Details: n/a    Special Comments/Notes: n/a    Thank you!

## 2025-04-24 NOTE — TELEPHONE ENCOUNTER
Ronald Bacon MD   Physician  Gastroenterology     Operative Report     Signed     Date of Service: 5/26/2022  9:01 AM  Case Time: Procedures: Surgeons:   5/26/2022  8:34 AM COLONOSCOPY    Ronald Bacon MD               Signed         [facility] Endoscopy Report        Preoperative Diagnosis:  - colon polyp history         Postoperative Diagnosis:  - colon polyps x 3  - diverticulosis   - internal hemorrhoids        Procedure:    Colonoscopy         Surgeon:  Ronald Bacon M.D.     Anesthesia:  MAC      Technique:  After informed consent, the patient was placed in the left lateral recumbent position.  Digital rectal examination revealed no palpable intraluminal abnormalities.  An Olympus variable stiffness 190 series HD colonoscope was inserted into the rectum and advanced under direct vision by following the lumen to the cecum.  The colon was examined upon withdrawal in the left lateral position.     The procedure was well tolerated without immediate complication.        Findings:  The preparation of the colon was good.  The terminal ileum was examined for 4 cm and visually normal.  The ileocecal valve was well preserved. The visualized colonic mucosa from the cecum to the anal verge was normal with an intact vascular pattern.     Colon polyps x3 removed as follows;  -Cecum x1, diminutive removed by cold forceps technique.  -Transverse x1, diminutive removed by cold forceps technique.  -Sigmoid x1, diminutive removed by cold forceps technique.  All polypectomy sites inspected and found to be free of bleeding and specimens retrieved and sent for analysis.     Diverticular disease with scattered rare diverticula noted in the mid colon, transverse region, no evidence of diverticulitis.     Small internal hemorrhoids noted on retroflexed view.     Estimated blood loss-insignificant  Specimens-colon polyps     Impression:  - colon polyps x 3  - diverticulosis   - internal hemorrhoids      Recommendations:  - Post polypectomy instructions given  - Repeat colonoscopy in 3- 5 years  - High fiber diet for diverticular disease  - Symptomatic treatment of hemorrhoids              Ronald Bacon MD  5/26/2022  9:01 AM                Ronald Bacon MD  5/26/2022  6:26 PM CDT       I wanted to get back to you with your colonoscopy results.  You had 3 colon polyps removed which were benign.  I would advise a repeat colonoscopy in 3 years to make sure no new polyps are forming.      You also have internal hemorrhoids and diverticulosis.  Please stay on a high fiber diet and call with any questions.             Specimen to Pathology Tissue: [id number]  Order: [id number]   Collected 5/26/2022  8:36 AM       Status: Final result       Dx: Hx of colonic polyps    Test Result Released: No (inaccessible in [employer])       Messages: Not Seen    2 Result Notes       1 Patient Communication       View Follow-Up Encounter        Component  Ref Range & Units (hover)      Case Report     Surgical Pathology                                Case: [id number]                                     Authorizing Provider:  Ronald Bacon MD       Collected:           05/26/2022 08:36 AM             Ordering Location:     [facility]          Received:            05/26/2022 09:23 AM                                    Endoscopy Lab Suites                                                           Pathologist:           Adebayo Maya MD                                                             Specimens:   A) - Sigmoid colon, polyp x1                                                                          B) - Colon transverse, polyp x1                                                                        C) - Cecum, polyp x1                                                                          Final Diagnosis:        A. Sigmoid colon polyp; polypectomy:   Fragment of tubular adenoma.     B. Transverse colon polyp;  polypectomy:   Fragment of tubular adenoma.     C. Cecum polyp; polypectomy:   Fragment of tubular adenoma.        Electronically signed by Adebayo Maya MD on 5/26/2022 at  5:00 PM        Clinical Information      Z86.010 Hx Of Colonic Polyps.   Polyps, hemorrhoids.     Gross Description      Specimen A is labeled \"Kreiss, sigmoid colon polyp x1\" received in formalin. The specimen consists of one fragment of pink-tan soft tissue measuring 0.3 cm in greatest dimension. The specimen is inked with eosin and submitted entirely in cassette A1.      Specimen B is labeled \"Kreiss, transverse colon polyp x1\" received in formalin. The specimen consists of one fragment of pink-tan soft tissue measuring 0.1 cm in greatest dimension. The specimen is inked with eosin and submitted entirely in cassette B1.     Specimen C is labeled \"Kreiss, cecal polyp x1\" received in formalin. The specimen consists of one fragment of pink-tan soft tissue measuring 0.3 cm in greatest dimension. The specimen is inked with eosin and submitted entirely in cassette C1. (jq)      Adebayo POWELL. REBECA Maya/[facility]       Interpretation     Benign     Electronically signed by Adebayo Maya MD on 5/26/2022 at  5:00 PM     Resulting Agency [facility] ([facility])               Specimen Collected: 05/26/22  8:36 AM Last Resulted: 05/26/22  5:00 PM

## 2025-04-29 NOTE — TELEPHONE ENCOUNTER
Colonoscopy for colon screening and hx colon polyps   Golytely  MAC     Please get input on anticoagulation adjustment, I typically hold the warfarin 3 days and check stat PT/INR day of procedure but since he has anti-thrombin 3 I wonder if he needs bridge

## 2025-04-30 NOTE — TELEPHONE ENCOUNTER
Schedulers:  Please contact patient to schedule colonoscopy, orders from Dr. Bacon below.  Please route back to nursing once scheduled so we can obtain blood thinner orders.    Thank you

## 2025-05-05 NOTE — TELEPHONE ENCOUNTER
Scheduled for:  Colonoscopy 96834  Provider Name:  Dr. Bacon  Date:  8/28/2025  Location:   [facility]  Sedation:  MAC  Time:  8:15 AM - Patient is aware [facility] will call the day before with arrival time.  Prep:  Golytely  Meds/Allergies Reconciled?:  Physician reviewed   Diagnosis with codes:  Colon cancer screening Z12.11; Hx: Colon polyps Z86.0100  Was patient informed to call insurance with codes (Y/N):  Yes, [facility] with the patient.   Referral sent?:  Referral was sent at the time of electronic surgical scheduling.   EM or [facility] notified?:  I sent an electronic request to Endo Scheduling and received a confirmation today.   Medication Orders:  Hold blood pressure medications the day before and day of procedure.   Misc Orders:  GI RN will obtain Warfarin orders from Dr. Gibson prior to the procedure.      Further instructions given by staff: I discussed the prep instructions with the patient which he verbally understood and is aware that I will mail the instructions today.

## 2025-05-07 NOTE — TELEPHONE ENCOUNTER
5/7/2025   Patient has been scheduled for colonoscopy.  8/28/2025 with Dr. Bacon. TE is closed.

## 2025-05-27 ENCOUNTER — ANTI-COAG VISIT (OUTPATIENT)
Dept: ANTICOAGULATION | Facility: CLINIC | Age: 70
End: 2025-05-27

## 2025-05-27 DIAGNOSIS — D68.9 BLOOD CLOTTING DISORDER (HCC): Primary | ICD-10-CM

## 2025-05-27 DIAGNOSIS — Z51.81 MONITORING FOR LONG-TERM ANTICOAGULANT USE: ICD-10-CM

## 2025-05-27 DIAGNOSIS — Z79.01 MONITORING FOR LONG-TERM ANTICOAGULANT USE: ICD-10-CM

## 2025-05-27 DIAGNOSIS — D68.59 HYPERCOAGULOPATHY (HCC): ICD-10-CM

## 2025-05-27 DIAGNOSIS — Z79.01 LONG TERM (CURRENT) USE OF ANTICOAGULANTS: ICD-10-CM

## 2025-05-27 LAB — INR: 2.6 (ref 2–3)

## 2025-05-27 PROCEDURE — 93793 ANTICOAG MGMT PT WARFARIN: CPT | Performed by: FAMILY MEDICINE

## 2025-05-27 NOTE — PROGRESS NOTES
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 (Goal 2.5 ) TTR 82.3 %     Etiology: stable    PLAN: continue the current dose.    Recheck INR 2-4 weeks.    WARFARIN Plan per protocol: 5 mg every day

## 2025-06-11 ENCOUNTER — MED REC SCAN ONLY (OUTPATIENT)
Dept: INTERNAL MEDICINE CLINIC | Facility: CLINIC | Age: 70
End: 2025-06-11

## 2025-06-23 ENCOUNTER — ANTI-COAG VISIT (OUTPATIENT)
Dept: ANTICOAGULATION | Facility: CLINIC | Age: 70
End: 2025-06-23

## 2025-06-23 DIAGNOSIS — D68.9 BLOOD CLOTTING DISORDER (HCC): Primary | ICD-10-CM

## 2025-06-23 DIAGNOSIS — Z79.01 LONG TERM (CURRENT) USE OF ANTICOAGULANTS: ICD-10-CM

## 2025-06-23 DIAGNOSIS — D68.59 HYPERCOAGULOPATHY (HCC): ICD-10-CM

## 2025-06-23 DIAGNOSIS — Z79.01 MONITORING FOR LONG-TERM ANTICOAGULANT USE: ICD-10-CM

## 2025-06-23 DIAGNOSIS — Z51.81 MONITORING FOR LONG-TERM ANTICOAGULANT USE: ICD-10-CM

## 2025-06-23 LAB — INR: 2.8 (ref 2–3)

## 2025-06-23 PROCEDURE — 93793 ANTICOAG MGMT PT WARFARIN: CPT | Performed by: FAMILY MEDICINE

## 2025-06-23 NOTE — PROGRESS NOTES
Acelis / INR 2.8,  therapeutic. (Goal 2.5 ) TTR 82.4 %     Etiology: stable.     PLAN: continue the current dose.    Recheck INR 4 weeks.    WARFARIN Plan per protocol: 5 mg every day

## 2025-07-21 ENCOUNTER — ANTI-COAG VISIT (OUTPATIENT)
Dept: ANTICOAGULATION | Facility: CLINIC | Age: 70
End: 2025-07-21

## 2025-07-21 DIAGNOSIS — D68.9 BLOOD CLOTTING DISORDER (HCC): Primary | ICD-10-CM

## 2025-07-21 DIAGNOSIS — Z79.01 LONG TERM (CURRENT) USE OF ANTICOAGULANTS: ICD-10-CM

## 2025-07-21 DIAGNOSIS — Z79.01 MONITORING FOR LONG-TERM ANTICOAGULANT USE: ICD-10-CM

## 2025-07-21 DIAGNOSIS — Z51.81 MONITORING FOR LONG-TERM ANTICOAGULANT USE: ICD-10-CM

## 2025-07-21 DIAGNOSIS — D68.59 HYPERCOAGULOPATHY (HCC): ICD-10-CM

## 2025-07-21 LAB — INR: 2.7 (ref 2–3)

## 2025-07-21 PROCEDURE — 93793 ANTICOAG MGMT PT WARFARIN: CPT | Performed by: FAMILY MEDICINE

## 2025-08-20 ENCOUNTER — TELEPHONE (OUTPATIENT)
Facility: CLINIC | Age: 70
End: 2025-08-20

## 2025-08-20 RX ORDER — SODIUM CHLORIDE, SODIUM LACTATE, POTASSIUM CHLORIDE, CALCIUM CHLORIDE 600; 310; 30; 20 MG/100ML; MG/100ML; MG/100ML; MG/100ML
INJECTION, SOLUTION INTRAVENOUS CONTINUOUS
Status: CANCELLED | OUTPATIENT
Start: 2025-08-20

## 2025-08-25 ENCOUNTER — ANTI-COAG VISIT (OUTPATIENT)
Dept: ANTICOAGULATION | Facility: CLINIC | Age: 70
End: 2025-08-25

## 2025-08-25 DIAGNOSIS — Z79.01 LONG TERM (CURRENT) USE OF ANTICOAGULANTS: ICD-10-CM

## 2025-08-25 DIAGNOSIS — Z79.01 MONITORING FOR LONG-TERM ANTICOAGULANT USE: ICD-10-CM

## 2025-08-25 DIAGNOSIS — D68.9 BLOOD CLOTTING DISORDER (HCC): Primary | ICD-10-CM

## 2025-08-25 DIAGNOSIS — D68.59 HYPERCOAGULOPATHY (HCC): ICD-10-CM

## 2025-08-25 DIAGNOSIS — Z51.81 MONITORING FOR LONG-TERM ANTICOAGULANT USE: ICD-10-CM

## 2025-08-25 LAB — INR: 2.8 (ref 2–3)

## 2025-08-25 PROCEDURE — 93793 ANTICOAG MGMT PT WARFARIN: CPT | Performed by: FAMILY MEDICINE

## 2025-08-28 ENCOUNTER — ANESTHESIA (OUTPATIENT)
Dept: ENDOSCOPY | Facility: HOSPITAL | Age: 70
End: 2025-08-28

## 2025-08-28 ENCOUNTER — ANESTHESIA EVENT (OUTPATIENT)
Dept: ENDOSCOPY | Facility: HOSPITAL | Age: 70
End: 2025-08-28

## 2025-08-28 ENCOUNTER — HOSPITAL ENCOUNTER (OUTPATIENT)
Facility: HOSPITAL | Age: 70
Setting detail: HOSPITAL OUTPATIENT SURGERY
Discharge: HOME | End: 2025-08-28
Attending: INTERNAL MEDICINE | Admitting: INTERNAL MEDICINE

## 2025-08-28 VITALS
HEART RATE: 63 BPM | OXYGEN SATURATION: 96 % | RESPIRATION RATE: 23 BRPM | WEIGHT: 205 LBS | SYSTOLIC BLOOD PRESSURE: 116 MMHG | DIASTOLIC BLOOD PRESSURE: 44 MMHG | HEIGHT: 70 IN | BODY MASS INDEX: 29.35 KG/M2

## 2025-08-28 DIAGNOSIS — Z86.0100 HISTORY OF COLON POLYPS: ICD-10-CM

## 2025-08-28 DIAGNOSIS — Z12.11 COLON CANCER SCREENING: ICD-10-CM

## 2025-08-28 LAB
INR BLD: 1.1 (ref 0.8–1.2)
PROTHROMBIN TIME: 15.2 SECONDS (ref 11.6–14.8)

## 2025-08-28 PROCEDURE — 45385 COLONOSCOPY W/LESION REMOVAL: CPT | Performed by: INTERNAL MEDICINE

## 2025-08-28 PROCEDURE — 45380 COLONOSCOPY AND BIOPSY: CPT | Performed by: INTERNAL MEDICINE

## 2025-08-28 RX ORDER — LIDOCAINE HYDROCHLORIDE 10 MG/ML
INJECTION, SOLUTION EPIDURAL; INFILTRATION; INTRACAUDAL; PERINEURAL AS NEEDED
Status: DISCONTINUED | OUTPATIENT
Start: 2025-08-28 | End: 2025-08-28 | Stop reason: SURG

## 2025-08-28 RX ORDER — NALOXONE HYDROCHLORIDE 0.4 MG/ML
0.08 INJECTION, SOLUTION INTRAMUSCULAR; INTRAVENOUS; SUBCUTANEOUS ONCE AS NEEDED
Status: DISCONTINUED | OUTPATIENT
Start: 2025-08-28 | End: 2025-08-28

## 2025-08-28 RX ORDER — SODIUM CHLORIDE, SODIUM LACTATE, POTASSIUM CHLORIDE, CALCIUM CHLORIDE 600; 310; 30; 20 MG/100ML; MG/100ML; MG/100ML; MG/100ML
INJECTION, SOLUTION INTRAVENOUS CONTINUOUS
Status: DISCONTINUED | OUTPATIENT
Start: 2025-08-28 | End: 2025-08-28

## 2025-08-28 RX ADMIN — LIDOCAINE HYDROCHLORIDE 50 MG: 10 INJECTION, SOLUTION EPIDURAL; INFILTRATION; INTRACAUDAL; PERINEURAL at 08:42:00

## 2025-08-28 RX ADMIN — SODIUM CHLORIDE, SODIUM LACTATE, POTASSIUM CHLORIDE, CALCIUM CHLORIDE: 600; 310; 30; 20 INJECTION, SOLUTION INTRAVENOUS at 09:12:00

## 2025-08-29 ENCOUNTER — TELEPHONE (OUTPATIENT)
Facility: CLINIC | Age: 70
End: 2025-08-29

## (undated) DIAGNOSIS — D68.9 BLOOD CLOTTING DISORDER (HCC): ICD-10-CM

## (undated) DIAGNOSIS — D68.51 ACTIVATED PROTEIN C RESISTANCE (HCC): ICD-10-CM

## (undated) DIAGNOSIS — Z79.01 LONG TERM (CURRENT) USE OF ANTICOAGULANTS: ICD-10-CM

## (undated) DIAGNOSIS — D68.59 HYPERCOAGULOPATHY (HCC): ICD-10-CM

## (undated) DIAGNOSIS — Z79.01 LONG TERM (CURRENT) USE OF ANTICOAGULANTS: Primary | ICD-10-CM

## (undated) DEVICE — SNARE ENDOSCOPIC 10MM ROUND

## (undated) DEVICE — Device

## (undated) DEVICE — KIT MFLD FOR SPEC COLL

## (undated) DEVICE — LINE MNTR ADLT SET O2 INTMD

## (undated) DEVICE — FORCEP RADIAL JAW 4

## (undated) DEVICE — Device: Brand: DEFENDO AIR/WATER/SUCTION AND BIOPSY VALVE

## (undated) DEVICE — SNARE OPTMZ PLPCTM TRP

## (undated) DEVICE — KIT ENDO ORCAPOD 160/180/190

## (undated) DEVICE — MEDI-VAC NON-CONDUCTIVE SUCTION TUBING 6MM X 1.8M (6FT.) L: Brand: CARDINAL HEALTH

## (undated) DEVICE — 35 ML SYRINGE REGULAR TIP: Brand: MONOJECT

## (undated) DEVICE — Device: Brand: CUSTOM PROCEDURE KIT

## (undated) DEVICE — KIT CLEAN ENDOKIT 1.1OZ GOWNX2

## (undated) DEVICE — TUBING SCT CLR 6FT .25IN MDVC

## (undated) DEVICE — SNARE CAPTIFLEX MICRO-OVL OLY

## (undated) NOTE — LETTER
[address] [place] [address]      Authorization for Surgical Operation and Procedure     Date:___________                                                                                                         Time:__________  1. I hereby Anthony Savage MD, my physician and his/her assistants (if applicable), which may include medical students, residents, and/or fellows, to perform the following surgical operation/ procedure and administer such anesthesia as may be determined necessary by my physician:  Operation/Procedure name (s) COLONOSCOPY  on Janalyn Quivers   2. I recognize that during the surgical operation/procedure, unforeseen conditions may necessitate additional or different procedures than those listed above. I, therefore, further authorize and request that the above-named surgeon, assistants, or designees perform such procedures as are, in their judgment, necessary and desirable. 3.   My surgeon/physician has discussed prior to my surgery the potential benefits, risks and side effects of this procedure; the likelihood of achieving goals; and potential problems that might occur during recuperation. They also discussed reasonable alternatives to the procedure, including risks, benefits, and side effects related to the alternatives and risks related to not receiving this procedure. I have had all my questions answered and I acknowledge that no guarantee has been made as to the result that may be obtained. 4.   Should the need arise during my operation or immediate post-operative period, I also consent to the administration of blood and/or blood products. Further, I understand that despite careful testing and screening of blood or blood products by collecting agencies, I may still be subject to ill effects as a result of receiving a blood transfusion and/or blood products.   The following are some, but not all, of the potential risks that can occur: fever and allergic reactions, hemolytic reactions, transmission of diseases such as Hepatitis, AIDS and Cytomegalovirus (CMV) and fluid overload. In the event that I wish to have an autologous transfusion of my own blood, or a directed donor transfusion. I will discuss this with my physician. 5.   I authorize the use of any specimen, organs, tissues, body parts or foreign objects that may be removed from my body during the operation/procedure for diagnosis, research or teaching purposes and their subsequent disposal by hospital authorities. I also authorize the release of specimen test results and/or written reports to my treating physician on the hospital medical staff or other referring or consulting physicians involved in my care, at the discretion of the Pathologist or my treating physician. 6.   I consent to the photographing or videotaping of the operations or procedures to be performed, including appropriate portions of my body for medical, scientific, or educational purposes, provided my identity is not revealed by the pictures or by descriptive texts accompanying them. If the procedure has been photographed/videotaped, the surgeon will obtain the original picture, image, videotape or CD. The hospital will not be responsible for storage, release or maintenance of the picture, image, tape or CD.    7.   I consent to the presence of a  or observers in the operating room as deemed necessary by my physician or their designees. 8.   I recognize that in the event my procedure results in extended X-Ray/fluoroscopy time, I may develop a skin reaction. 9. If I have a Do Not Attempt Resuscitation (DNAR) order in place, that status will be suspended while in the operating room, procedural suite, and during the recovery period unless otherwise explicitly stated by me (or a person authorized to consent on my behalf).  The surgeon or my attending physician will determine when the applicable recovery period ends for purposes of reinstating the DNAR order. 10. Patients having a sterilization procedure: I understand that if the procedure is successful the results will be permanent and it will therefore be impossible for me to inseminate, conceive, or bear children. I also understand that the procedure is intended to result in sterility, although the result has not been guaranteed. 11. I acknowledge that my physician has explained sedation/analgesia administration to me including the risk and benefits I consent to the administration of sedation/analgesia as may be necessary or desirable in the judgment of my physician. I CERTIFY THAT I HAVE READ AND FULLY UNDERSTAND THE ABOVE CONSENT TO OPERATION and/or OTHER PROCEDURE.    _________________________________________  __________________________________  Signature of Patient     Signature of Responsible Person         ___________________________________         Printed Name of Responsible Person           _________________________________                  Relationship to Patient  _________________________________________  ______________________________  Signature of Witness          Date  Time    STATEMENT OF PHYSICIAN My signature below affirms that prior to the time of the procedure; I have explained to the patient and/or his/her legal representative, the risks and benefits involved in the proposed treatment and any reasonable alternative to the proposed treatment. I have also explained the risks and benefits involved in refusal of the proposed treatment and alternatives to the proposed treatment and have answered the patient's questions. If I have a significant financial interest in a co-management agreement or a significant financial interest in any product or implant, or other significant relationship used in this procedure/surgery, I have disclosed this and had a discussion with my patient. _______________________________________________________________ _____________________________  Lookeba Roberts of Physician)                                                                                         (Date)                                   (Time)        Patient Name: Coby Johnson    : 1955   Printed: 2022      Medical Record #: [id number]                                              Page 1 of

## (undated) NOTE — LETTER
2/28/2025    De Patel        [address] [facility] [address]            Dear De Patel,      Our records indicate that you are due for an appointment for a Colonoscopy with Ronald Bacon MD. Our doctors are booking out about 3-6 months in advance for procedures.     Please call our office to schedule this appointment.  Your medical well-being is important to us.    If your insurance requires a referral, please call your primary care office to request one.      Thank you,      The Physicians and Staff at [facility]

## (undated) NOTE — LETTER
7/2/2020              De FARMER [address] [address]         To Whom it May Concern: This Patient of mine has a hypercoagulable state and history of Deep Venous Thrombosis in his right lower extremity.  Please prov

## (undated) NOTE — LETTER
4/22/2019              De FARMER [address] [place] [address] [address] [address]         Dear [facility],    I wanted to get back to you with your colonoscopy results. You had colon polyps removed which were benign.   I would advise a repeat colono

## (undated) NOTE — LETTER
[facility]  Administration of Anesthesia  1. Ryan Gupta, or _________________________________ acting on his behalf, (Patient) (Dependent/Representative) request to receive anesthesia for my pending procedure/operation/treatment. A physician (anesthesiologist) alone or an anesthesiologist working with a nurse anesthetist may administer my anesthesia. 2. I understand that my anesthesiologist is not an employee or agent of the hospital, but is an independent medical practitioner who has been permitted to use its facilities for the care and treatment of his/her patients. 3. I acknowledge that a physician from [facility] or their designate(s), recommended anesthesia for me using her/his medical judgment. The type(s) of anesthesia I may receive include:                a) General Anesthesia, b) Spinal/Epidural Anesthesia, c) Regional Anesthesia or d) Monitored Anesthesia Care. 4. If my spinal, regional or monitored anesthesia care (local) is not satisfactory for my comfort, or if my medical condition requires, I consent to the administration of general anesthesia. 5. I am aware that the practice of anesthesiology is not an exact science and that some foreseeable risks or consequences may occur. Some common risks/consequences include sore throat and hoarseness, nausea and vomiting, muscle soreness, backache, damage to the mouth/teeth/vocal cords and eye injury. I understand that more rare but serious potential risks of anesthesia include blood pressure changes, drug reactions, cardiac arrest, brain damage, paralysis or death. These risks apply to whether I have general, spinal/epidural, regional or monitored anesthesia care. 6. OBSTETRIC PATIENTS: Specific risks/consequences of spinal/epidural anesthesia may include itching, low blood pressure, difficulty urinating, slowing of the baby's heart rate and headache.  Rare risks include infections, high spinal block, spinal bleeding, seizure, cardiac arrest and death. 7. AWARENESS: I understand that it is possible (but unlikely) to have explicit memory of events from the operating room while under general anesthesia. 8. ELECTROCONVULSIVE THERAPY PATIENTS: This consent serves for all treatments in a single course of therapy. 9. I understand that I must inform my anesthesiologist when I last ate and/or drank to minimize the risk of anesthesia. 10. If I am pregnant, or may pregnant, I understand that elective surgery should be postponed until after the baby is born. Anesthetics cross the placenta and may temporarily anesthetize the baby. Although fetal complications of anesthesia during pregnancy are rare, they may include birth defects, premature labor, brain damage and death. 11. I certify that I informed the anesthesiologist, to the best of my ability, about medication I take including blood thinners, anticoagulants, herbal remedies, narcotics and recreational drugs (e.g. cocaine, marijuana, PCP). Failure to inform my anesthesiologist about these medicines may increase my risk of anesthetic complications. The nature and purpose of my anesthetic management was explained to me. I had the opportunity to ask questions and the answers and information provided meet my satisfaction.   I retain the right to withdraw this consent at any time prior to the administration of said anesthetic.    ___________________________________________________           _____________________________________________________  Patient Signature                                                                                      Witness Signature                ___________________________________________________           _____________________________________________________  Date/Time                                                                                               Responsible person in case of minor/ unconscious pt /Relationship    My signature below affirms that prior to the time of the procedure, I have explained to the patient and/or his/her guardian, the risks and benefits of undergoing anesthesia, as well as any reasonable alternatives.     ___________________________________________________            _____________________________________________________  Physician Signature                            Date/Time  Patient Name: Ju Rodriguez     : 1955     Printed: 2022      Medical Record #: [id number]                              Page 1 of 1    ----------ANESTHESIA CONSENT----------